# Patient Record
Sex: MALE | Race: WHITE | Employment: FULL TIME | URBAN - METROPOLITAN AREA
[De-identification: names, ages, dates, MRNs, and addresses within clinical notes are randomized per-mention and may not be internally consistent; named-entity substitution may affect disease eponyms.]

---

## 2017-01-19 ENCOUNTER — OFFICE VISIT (OUTPATIENT)
Dept: FAMILY MEDICINE CLINIC | Age: 52
End: 2017-01-19

## 2017-01-19 VITALS
RESPIRATION RATE: 18 BRPM | SYSTOLIC BLOOD PRESSURE: 136 MMHG | BODY MASS INDEX: 40.09 KG/M2 | HEIGHT: 70 IN | DIASTOLIC BLOOD PRESSURE: 78 MMHG | TEMPERATURE: 98 F | HEART RATE: 74 BPM | WEIGHT: 280 LBS

## 2017-01-19 DIAGNOSIS — J02.9 SORE THROAT: Primary | ICD-10-CM

## 2017-01-19 DIAGNOSIS — B34.9 VIRAL ILLNESS: ICD-10-CM

## 2017-01-19 LAB — S PYO AG THROAT QL: NORMAL

## 2017-01-19 PROCEDURE — G8427 DOCREV CUR MEDS BY ELIG CLIN: HCPCS | Performed by: NURSE PRACTITIONER

## 2017-01-19 PROCEDURE — 87880 STREP A ASSAY W/OPTIC: CPT | Performed by: NURSE PRACTITIONER

## 2017-01-19 PROCEDURE — G8419 CALC BMI OUT NRM PARAM NOF/U: HCPCS | Performed by: NURSE PRACTITIONER

## 2017-01-19 PROCEDURE — 1036F TOBACCO NON-USER: CPT | Performed by: NURSE PRACTITIONER

## 2017-01-19 PROCEDURE — G8483 FLU IMM NO ADMIN DOC REA: HCPCS | Performed by: NURSE PRACTITIONER

## 2017-01-19 PROCEDURE — 99213 OFFICE O/P EST LOW 20 MIN: CPT | Performed by: NURSE PRACTITIONER

## 2017-01-19 ASSESSMENT — ENCOUNTER SYMPTOMS
NAUSEA: 0
SINUS PRESSURE: 0
COUGH: 1
DIARRHEA: 0
ABDOMINAL PAIN: 0
VISUAL CHANGE: 0
RHINORRHEA: 0
VOMITING: 0
ALLERGIC/IMMUNOLOGIC NEGATIVE: 1
WHEEZING: 0
EYES NEGATIVE: 1
TROUBLE SWALLOWING: 0
GASTROINTESTINAL NEGATIVE: 1
SORE THROAT: 1
SHORTNESS OF BREATH: 0
SWOLLEN GLANDS: 0

## 2017-04-19 ENCOUNTER — OFFICE VISIT (OUTPATIENT)
Dept: FAMILY MEDICINE CLINIC | Age: 52
End: 2017-04-19

## 2017-04-19 VITALS
HEART RATE: 73 BPM | BODY MASS INDEX: 39.8 KG/M2 | HEIGHT: 70 IN | WEIGHT: 278 LBS | TEMPERATURE: 98.1 F | RESPIRATION RATE: 95 BRPM | DIASTOLIC BLOOD PRESSURE: 84 MMHG | SYSTOLIC BLOOD PRESSURE: 132 MMHG

## 2017-04-19 DIAGNOSIS — J02.9 SORE THROAT: ICD-10-CM

## 2017-04-19 DIAGNOSIS — J01.90 ACUTE RHINOSINUSITIS: Primary | ICD-10-CM

## 2017-04-19 LAB — S PYO AG THROAT QL: NORMAL

## 2017-04-19 PROCEDURE — G8419 CALC BMI OUT NRM PARAM NOF/U: HCPCS | Performed by: NURSE PRACTITIONER

## 2017-04-19 PROCEDURE — 1036F TOBACCO NON-USER: CPT | Performed by: NURSE PRACTITIONER

## 2017-04-19 PROCEDURE — 87880 STREP A ASSAY W/OPTIC: CPT | Performed by: NURSE PRACTITIONER

## 2017-04-19 PROCEDURE — 99212 OFFICE O/P EST SF 10 MIN: CPT | Performed by: NURSE PRACTITIONER

## 2017-04-19 PROCEDURE — G8427 DOCREV CUR MEDS BY ELIG CLIN: HCPCS | Performed by: NURSE PRACTITIONER

## 2017-04-19 RX ORDER — AZITHROMYCIN 250 MG/1
TABLET, FILM COATED ORAL
Qty: 6 TABLET | Refills: 0 | Status: SHIPPED | OUTPATIENT
Start: 2017-04-19 | End: 2017-07-05 | Stop reason: ALTCHOICE

## 2017-04-19 ASSESSMENT — ENCOUNTER SYMPTOMS
COUGH: 1
WHEEZING: 1
RHINORRHEA: 1
SORE THROAT: 1
NAUSEA: 0
SINUS PAIN: 1
VOMITING: 0
DIARRHEA: 0
SWOLLEN GLANDS: 1

## 2017-04-19 ASSESSMENT — PATIENT HEALTH QUESTIONNAIRE - PHQ9
SUM OF ALL RESPONSES TO PHQ9 QUESTIONS 1 & 2: 0
SUM OF ALL RESPONSES TO PHQ QUESTIONS 1-9: 0
2. FEELING DOWN, DEPRESSED OR HOPELESS: 0
1. LITTLE INTEREST OR PLEASURE IN DOING THINGS: 0

## 2017-07-05 ENCOUNTER — OFFICE VISIT (OUTPATIENT)
Dept: FAMILY MEDICINE CLINIC | Age: 52
End: 2017-07-05

## 2017-07-05 VITALS
HEART RATE: 82 BPM | DIASTOLIC BLOOD PRESSURE: 86 MMHG | BODY MASS INDEX: 40.06 KG/M2 | WEIGHT: 279.2 LBS | SYSTOLIC BLOOD PRESSURE: 122 MMHG | TEMPERATURE: 98.8 F | RESPIRATION RATE: 16 BRPM

## 2017-07-05 DIAGNOSIS — H65.02 ACUTE SEROUS OTITIS MEDIA OF LEFT EAR, RECURRENCE NOT SPECIFIED: Primary | ICD-10-CM

## 2017-07-05 PROCEDURE — G8427 DOCREV CUR MEDS BY ELIG CLIN: HCPCS | Performed by: NURSE PRACTITIONER

## 2017-07-05 PROCEDURE — 3017F COLORECTAL CA SCREEN DOC REV: CPT | Performed by: NURSE PRACTITIONER

## 2017-07-05 PROCEDURE — 1036F TOBACCO NON-USER: CPT | Performed by: NURSE PRACTITIONER

## 2017-07-05 PROCEDURE — G8417 CALC BMI ABV UP PARAM F/U: HCPCS | Performed by: NURSE PRACTITIONER

## 2017-07-05 PROCEDURE — 99213 OFFICE O/P EST LOW 20 MIN: CPT | Performed by: NURSE PRACTITIONER

## 2017-07-05 RX ORDER — AZITHROMYCIN 250 MG/1
TABLET, FILM COATED ORAL
Qty: 6 TABLET | Refills: 0 | Status: SHIPPED | OUTPATIENT
Start: 2017-07-05

## 2017-07-05 ASSESSMENT — ENCOUNTER SYMPTOMS
RHINORRHEA: 0
SINUS PRESSURE: 0
SORE THROAT: 0

## 2023-03-20 DIAGNOSIS — R00.2 HEART PALPITATIONS: Primary | ICD-10-CM

## 2023-03-20 PROBLEM — L72.9 SCALP CYST: Status: ACTIVE | Noted: 2023-03-20

## 2023-03-20 PROBLEM — L63.9 ALOPECIA AREATA: Status: ACTIVE | Noted: 2023-03-20

## 2023-03-20 PROBLEM — J00 NASOPHARYNGITIS: Status: ACTIVE | Noted: 2023-03-20

## 2023-03-20 PROBLEM — I10 BENIGN ESSENTIAL HYPERTENSION: Status: ACTIVE | Noted: 2023-03-20

## 2023-03-20 PROBLEM — J34.89 NASAL CONGESTION WITH RHINORRHEA: Status: ACTIVE | Noted: 2023-03-20

## 2023-03-20 PROBLEM — T75.3XXA SEA SICKNESS: Status: ACTIVE | Noted: 2023-03-20

## 2023-03-20 PROBLEM — K42.9 UMBILICAL HERNIA WITHOUT OBSTRUCTION AND WITHOUT GANGRENE: Status: ACTIVE | Noted: 2023-03-20

## 2023-03-20 PROBLEM — H66.91 ACUTE RIGHT OTITIS MEDIA: Status: ACTIVE | Noted: 2023-03-20

## 2023-03-20 PROBLEM — Z20.822 SUSPECTED COVID-19 VIRUS INFECTION: Status: ACTIVE | Noted: 2023-03-20

## 2023-03-20 PROBLEM — J01.40 ACUTE NON-RECURRENT PANSINUSITIS: Status: ACTIVE | Noted: 2023-03-20

## 2023-03-20 PROBLEM — E78.00 HYPERCHOLESTEROLEMIA: Status: ACTIVE | Noted: 2023-03-20

## 2023-03-20 PROBLEM — R03.0 PREHYPERTENSION: Status: ACTIVE | Noted: 2023-03-20

## 2023-03-20 PROBLEM — M54.16 LUMBAR RADICULOPATHY, RIGHT: Status: ACTIVE | Noted: 2023-03-20

## 2023-03-20 PROBLEM — R05.9 COUGH: Status: ACTIVE | Noted: 2023-03-20

## 2023-03-20 PROBLEM — M47.26 OSTEOARTHRITIS OF SPINE WITH RADICULOPATHY, LUMBAR REGION: Status: ACTIVE | Noted: 2023-03-20

## 2023-03-20 PROBLEM — R09.81 NASAL CONGESTION WITH RHINORRHEA: Status: ACTIVE | Noted: 2023-03-20

## 2023-03-20 RX ORDER — ROSUVASTATIN CALCIUM 10 MG/1
TABLET, COATED ORAL
COMMUNITY
Start: 2020-12-13 | End: 2023-07-31

## 2023-03-20 RX ORDER — LISINOPRIL 5 MG/1
TABLET ORAL
COMMUNITY
Start: 2020-01-22 | End: 2023-08-18

## 2023-03-20 RX ORDER — VIT C/E/ZN/COPPR/LUTEIN/ZEAXAN 250MG-90MG
CAPSULE ORAL
COMMUNITY
Start: 2022-01-11

## 2023-03-20 RX ORDER — SCOLOPAMINE TRANSDERMAL SYSTEM 1 MG/1
PATCH, EXTENDED RELEASE TRANSDERMAL
COMMUNITY
Start: 2023-01-17

## 2023-03-20 RX ORDER — METOPROLOL SUCCINATE 25 MG/1
1 TABLET, EXTENDED RELEASE ORAL DAILY
COMMUNITY
Start: 2022-02-16 | End: 2023-03-20 | Stop reason: SDUPTHER

## 2023-03-20 RX ORDER — METOPROLOL SUCCINATE 25 MG/1
TABLET, EXTENDED RELEASE ORAL
Qty: 90 TABLET | Refills: 3 | Status: SHIPPED | OUTPATIENT
Start: 2023-03-20 | End: 2024-02-21

## 2023-07-31 DIAGNOSIS — E78.00 HYPERCHOLESTEROLEMIA: ICD-10-CM

## 2023-07-31 RX ORDER — ROSUVASTATIN CALCIUM 10 MG/1
TABLET, COATED ORAL
Qty: 90 TABLET | Refills: 0 | Status: SHIPPED | OUTPATIENT
Start: 2023-07-31 | End: 2023-10-29

## 2023-07-31 NOTE — TELEPHONE ENCOUNTER
Please advise Dr. Nguyen patient.       Medication has been pended to provider for approval.     LV: 1/10/23  NV:  none

## 2023-08-17 DIAGNOSIS — I10 BENIGN ESSENTIAL HYPERTENSION: ICD-10-CM

## 2023-08-18 RX ORDER — LISINOPRIL 5 MG/1
TABLET ORAL DAILY
Qty: 90 TABLET | Refills: 3 | Status: SHIPPED | OUTPATIENT
Start: 2023-08-18

## 2023-10-02 ENCOUNTER — OFFICE VISIT (OUTPATIENT)
Dept: PRIMARY CARE | Facility: CLINIC | Age: 58
End: 2023-10-02
Payer: COMMERCIAL

## 2023-10-02 VITALS
OXYGEN SATURATION: 95 % | DIASTOLIC BLOOD PRESSURE: 74 MMHG | TEMPERATURE: 97.6 F | SYSTOLIC BLOOD PRESSURE: 122 MMHG | HEART RATE: 85 BPM | BODY MASS INDEX: 40.46 KG/M2 | WEIGHT: 282.6 LBS | RESPIRATION RATE: 16 BRPM | HEIGHT: 70 IN

## 2023-10-02 DIAGNOSIS — L03.115 CELLULITIS OF RIGHT LOWER EXTREMITY: Primary | ICD-10-CM

## 2023-10-02 PROCEDURE — 99213 OFFICE O/P EST LOW 20 MIN: CPT | Performed by: NURSE PRACTITIONER

## 2023-10-02 RX ORDER — DOXYCYCLINE 100 MG/1
100 CAPSULE ORAL 2 TIMES DAILY
Qty: 20 CAPSULE | Refills: 0 | Status: SHIPPED | OUTPATIENT
Start: 2023-10-02 | End: 2023-10-10 | Stop reason: SDUPTHER

## 2023-10-02 ASSESSMENT — ENCOUNTER SYMPTOMS
FEVER: 1
COUGH: 1
DIZZINESS: 0
SORE THROAT: 0
WEAKNESS: 0
SINUS PRESSURE: 0
PALPITATIONS: 0
COLOR CHANGE: 1
FATIGUE: 1
CHILLS: 1
SINUS PAIN: 0
HEADACHES: 1
SHORTNESS OF BREATH: 0

## 2023-10-02 ASSESSMENT — PATIENT HEALTH QUESTIONNAIRE - PHQ9
2. FEELING DOWN, DEPRESSED OR HOPELESS: NOT AT ALL
SUM OF ALL RESPONSES TO PHQ9 QUESTIONS 1 AND 2: 0
1. LITTLE INTEREST OR PLEASURE IN DOING THINGS: NOT AT ALL

## 2023-10-02 NOTE — PROGRESS NOTES
"Subjective   Patient ID: Hay Miller is a 58 y.o. male who presents for URI and Cellulitis.    HPI pt in office with an URI symptoms of fever and chills, and cough. Pt took otc advil with no help.    Pt in office with right leg cellulitis has redness , warm to touch started in groin area and is sore all the way down to leg. On right side. Pt did not take any otc meds for this symptom.      68-year-old male presents today with possible cellulitis on his right leg.  He states that his symptoms started a few days prior. Yesterday he noticed that his right groin was tender.  Today, he noticed redness and warmth on his right shin.  Patient does report a history of cellulitis, and states that this feels very similar to when he last was treated.  He denies any history of MRSA.  No history of DVT, He did take ibuprofen earlier today, and states that he was feeling chilled yesterday.  He is also experiencing a slight cough.    Review of Systems   Constitutional:  Positive for chills, fatigue and fever.   HENT:  Negative for congestion, sinus pressure, sinus pain and sore throat.    Respiratory:  Positive for cough. Negative for shortness of breath.    Cardiovascular:  Negative for chest pain and palpitations.   Skin:  Positive for color change.   Neurological:  Positive for headaches. Negative for dizziness and weakness.       Objective   /74   Pulse 85   Temp 36.4 °C (97.6 °F) (Temporal)   Resp 16   Ht 1.778 m (5' 10\")   Wt 128 kg (282 lb 9.6 oz)   SpO2 95%   BMI 40.55 kg/m²     Physical Exam  Vitals and nursing note reviewed.   Constitutional:       Appearance: Normal appearance. He is obese.   Cardiovascular:      Rate and Rhythm: Normal rate and regular rhythm.   Pulmonary:      Effort: Pulmonary effort is normal. No respiratory distress.      Breath sounds: Normal breath sounds.   Lymphadenopathy:      Lower Body: Right inguinal adenopathy present.   Skin:     Comments: RIGHT LOWER EXTREMITY: Area " of redness, warmth and tenderness noted over anterior shin approximately 4 in in length. No calf tenderness or pain. Negative Twan's sign.   Neurological:      Mental Status: He is alert.         Assessment/Plan   Problem List Items Addressed This Visit    None  Visit Diagnoses         Codes    Cellulitis of right lower extremity    -  Primary L03.115    Relevant Medications    doxycycline (Vibramycin) 100 mg capsule        Cellulitis: Will treat with doxycycline.  Follow-up with PCP in 2 to 3 days for recheck, or sooner with any additional concerns.  If any worsening symptoms, increasing redness, pain, red streaking coming from the area, developing fever/chills, proceed to emergency department.

## 2023-10-02 NOTE — PATIENT INSTRUCTIONS
Today you were seen for a skin infection/cellulitis.  Keep area clean and dry.  Keep area clean with regular soap and water.  Do not soak the area, (no baths or swimming, but may take showers).  Start antibiotic as directed.  Please follow-up with your primary care provider in 2-3 days for recheck with any persisting symptoms, or sooner with any additional concerns.  If developing any worsening pain, redness, swelling, red streaking coming from the area or fever/chills, go to the emergency department for further evaluation.

## 2023-10-04 ENCOUNTER — HOSPITAL ENCOUNTER (EMERGENCY)
Facility: HOSPITAL | Age: 58
Discharge: HOME | End: 2023-10-04
Attending: EMERGENCY MEDICINE
Payer: COMMERCIAL

## 2023-10-04 ENCOUNTER — APPOINTMENT (OUTPATIENT)
Dept: CARDIOLOGY | Facility: HOSPITAL | Age: 58
End: 2023-10-04
Payer: COMMERCIAL

## 2023-10-04 VITALS
HEIGHT: 70 IN | DIASTOLIC BLOOD PRESSURE: 92 MMHG | OXYGEN SATURATION: 94 % | BODY MASS INDEX: 40.23 KG/M2 | WEIGHT: 281 LBS | HEART RATE: 78 BPM | RESPIRATION RATE: 18 BRPM | SYSTOLIC BLOOD PRESSURE: 130 MMHG | TEMPERATURE: 98.1 F

## 2023-10-04 DIAGNOSIS — L03.115 CELLULITIS OF RIGHT LOWER EXTREMITY: Primary | ICD-10-CM

## 2023-10-04 DIAGNOSIS — M79.89 OTHER SPECIFIED SOFT TISSUE DISORDERS: ICD-10-CM

## 2023-10-04 LAB
ALBUMIN SERPL BCP-MCNC: 3.9 G/DL (ref 3.4–5)
ALP SERPL-CCNC: 60 U/L (ref 33–120)
ALT SERPL W P-5'-P-CCNC: 29 U/L (ref 10–52)
ANION GAP SERPL CALC-SCNC: 11 MMOL/L (ref 10–20)
AST SERPL W P-5'-P-CCNC: 22 U/L (ref 9–39)
BASOPHILS # BLD AUTO: 0.05 X10*3/UL (ref 0–0.1)
BASOPHILS NFR BLD AUTO: 0.6 %
BILIRUB SERPL-MCNC: 0.7 MG/DL (ref 0–1.2)
BUN SERPL-MCNC: 15 MG/DL (ref 6–23)
CALCIUM SERPL-MCNC: 9.3 MG/DL (ref 8.6–10.3)
CHLORIDE SERPL-SCNC: 102 MMOL/L (ref 98–107)
CO2 SERPL-SCNC: 27 MMOL/L (ref 21–32)
CREAT SERPL-MCNC: 1.15 MG/DL (ref 0.5–1.3)
EOSINOPHIL # BLD AUTO: 0.04 X10*3/UL (ref 0–0.7)
EOSINOPHIL NFR BLD AUTO: 0.5 %
ERYTHROCYTE [DISTWIDTH] IN BLOOD BY AUTOMATED COUNT: 12.6 % (ref 11.5–14.5)
GFR SERPL CREATININE-BSD FRML MDRD: 75 ML/MIN/1.73M*2
GLUCOSE SERPL-MCNC: 112 MG/DL (ref 74–99)
HCT VFR BLD AUTO: 47.3 % (ref 41–52)
HGB BLD-MCNC: 16.2 G/DL (ref 13.5–17.5)
IMM GRANULOCYTES # BLD AUTO: 0.04 X10*3/UL (ref 0–0.7)
IMM GRANULOCYTES NFR BLD AUTO: 0.5 % (ref 0–0.9)
LYMPHOCYTES # BLD AUTO: 2.09 X10*3/UL (ref 1.2–4.8)
LYMPHOCYTES NFR BLD AUTO: 25.8 %
MCH RBC QN AUTO: 29.8 PG (ref 26–34)
MCHC RBC AUTO-ENTMCNC: 34.2 G/DL (ref 32–36)
MCV RBC AUTO: 87 FL (ref 80–100)
MONOCYTES # BLD AUTO: 1.03 X10*3/UL (ref 0.1–1)
MONOCYTES NFR BLD AUTO: 12.7 %
NEUTROPHILS # BLD AUTO: 4.84 X10*3/UL (ref 1.2–7.7)
NEUTROPHILS NFR BLD AUTO: 59.9 %
NRBC BLD-RTO: 0 /100 WBCS (ref 0–0)
PLATELET # BLD AUTO: 232 X10*3/UL (ref 150–450)
PMV BLD AUTO: 9.9 FL (ref 7.5–11.5)
POTASSIUM SERPL-SCNC: 4.5 MMOL/L (ref 3.5–5.3)
PROT SERPL-MCNC: 7.2 G/DL (ref 6.4–8.2)
RBC # BLD AUTO: 5.43 X10*6/UL (ref 4.5–5.9)
SODIUM SERPL-SCNC: 135 MMOL/L (ref 136–145)
WBC # BLD AUTO: 8.1 X10*3/UL (ref 4.4–11.3)

## 2023-10-04 PROCEDURE — 93971 EXTREMITY STUDY: CPT

## 2023-10-04 PROCEDURE — 99284 EMERGENCY DEPT VISIT MOD MDM: CPT | Mod: 25 | Performed by: EMERGENCY MEDICINE

## 2023-10-04 PROCEDURE — 85025 COMPLETE CBC W/AUTO DIFF WBC: CPT | Performed by: EMERGENCY MEDICINE

## 2023-10-04 PROCEDURE — 99284 EMERGENCY DEPT VISIT MOD MDM: CPT | Performed by: EMERGENCY MEDICINE

## 2023-10-04 PROCEDURE — 96365 THER/PROPH/DIAG IV INF INIT: CPT

## 2023-10-04 PROCEDURE — 96375 TX/PRO/DX INJ NEW DRUG ADDON: CPT

## 2023-10-04 PROCEDURE — 2500000004 HC RX 250 GENERAL PHARMACY W/ HCPCS (ALT 636 FOR OP/ED)

## 2023-10-04 PROCEDURE — 2500000004 HC RX 250 GENERAL PHARMACY W/ HCPCS (ALT 636 FOR OP/ED): Performed by: EMERGENCY MEDICINE

## 2023-10-04 PROCEDURE — 36415 COLL VENOUS BLD VENIPUNCTURE: CPT | Performed by: EMERGENCY MEDICINE

## 2023-10-04 PROCEDURE — 80053 COMPREHEN METABOLIC PANEL: CPT | Performed by: EMERGENCY MEDICINE

## 2023-10-04 PROCEDURE — 93971 EXTREMITY STUDY: CPT | Performed by: INTERNAL MEDICINE

## 2023-10-04 RX ORDER — KETOROLAC TROMETHAMINE 15 MG/ML
15 INJECTION, SOLUTION INTRAMUSCULAR; INTRAVENOUS ONCE
Status: COMPLETED | OUTPATIENT
Start: 2023-10-04 | End: 2023-10-04

## 2023-10-04 RX ORDER — CEFUROXIME AXETIL 500 MG/1
500 TABLET ORAL 2 TIMES DAILY
Qty: 14 TABLET | Refills: 0 | Status: SHIPPED | OUTPATIENT
Start: 2023-10-04 | End: 2023-10-04 | Stop reason: SDUPTHER

## 2023-10-04 RX ORDER — CEFUROXIME AXETIL 500 MG/1
500 TABLET ORAL 2 TIMES DAILY
Qty: 14 TABLET | Refills: 0 | Status: SHIPPED | OUTPATIENT
Start: 2023-10-04 | End: 2023-10-10 | Stop reason: SDUPTHER

## 2023-10-04 RX ORDER — KETOROLAC TROMETHAMINE 30 MG/ML
15 INJECTION, SOLUTION INTRAMUSCULAR; INTRAVENOUS ONCE
Status: DISCONTINUED | OUTPATIENT
Start: 2023-10-04 | End: 2023-10-04

## 2023-10-04 RX ORDER — CEFTRIAXONE 1 G/50ML
1 INJECTION, SOLUTION INTRAVENOUS ONCE
Status: COMPLETED | OUTPATIENT
Start: 2023-10-04 | End: 2023-10-04

## 2023-10-04 RX ADMIN — KETOROLAC TROMETHAMINE 15 MG: 15 INJECTION, SOLUTION INTRAMUSCULAR; INTRAVENOUS at 11:57

## 2023-10-04 RX ADMIN — CEFTRIAXONE SODIUM 1 G: 1 INJECTION, SOLUTION INTRAVENOUS at 11:18

## 2023-10-04 ASSESSMENT — LIFESTYLE VARIABLES
EVER FELT BAD OR GUILTY ABOUT YOUR DRINKING: NO
HAVE PEOPLE ANNOYED YOU BY CRITICIZING YOUR DRINKING: NO
EVER HAD A DRINK FIRST THING IN THE MORNING TO STEADY YOUR NERVES TO GET RID OF A HANGOVER: NO
HAVE YOU EVER FELT YOU SHOULD CUT DOWN ON YOUR DRINKING: NO

## 2023-10-04 ASSESSMENT — COLUMBIA-SUICIDE SEVERITY RATING SCALE - C-SSRS
6. HAVE YOU EVER DONE ANYTHING, STARTED TO DO ANYTHING, OR PREPARED TO DO ANYTHING TO END YOUR LIFE?: NO
1. IN THE PAST MONTH, HAVE YOU WISHED YOU WERE DEAD OR WISHED YOU COULD GO TO SLEEP AND NOT WAKE UP?: NO
2. HAVE YOU ACTUALLY HAD ANY THOUGHTS OF KILLING YOURSELF?: NO

## 2023-10-04 ASSESSMENT — PAIN SCALES - GENERAL
PAINLEVEL_OUTOF10: 5 - MODERATE PAIN
PAINLEVEL_OUTOF10: 5 - MODERATE PAIN

## 2023-10-04 ASSESSMENT — PAIN - FUNCTIONAL ASSESSMENT: PAIN_FUNCTIONAL_ASSESSMENT: 0-10

## 2023-10-04 ASSESSMENT — PAIN DESCRIPTION - ORIENTATION: ORIENTATION: RIGHT

## 2023-10-04 ASSESSMENT — PAIN DESCRIPTION - DESCRIPTORS: DESCRIPTORS: ACHING

## 2023-10-04 ASSESSMENT — PAIN DESCRIPTION - LOCATION: LOCATION: LEG

## 2023-10-04 NOTE — ED PROVIDER NOTES
HPI   Chief Complaint   Patient presents with    Leg Swelling     Right leg swelling since Saturday pt was dx with cellulitis and given oral ATB, pt states not improving       Patient is a 58-year-old male presenting with complaint of right lower extremity cellulitis.  He was diagnosed with this past Saturday and was started on doxycycline as outpatient.  Has not had any significant improvement in his symptoms.  The leg is still swollen red and painful.  He is also had fevers at home intermittently.  Denies any nausea vomiting chest pain shortness of breath lightheadedness dizziness.  Has not had issues with cellulitis in this leg before, states he has had in the left lower extremity performed.  History is otherwise notable for hypertension, hyperlipidemia.  Denies tobacco use, endorses occasional alcohol use, denies any other drug use.                              No data recorded                Patient History   No past medical history on file.  Past Surgical History:   Procedure Laterality Date    OTHER SURGICAL HISTORY  07/09/2019    Cyst excision    OTHER SURGICAL HISTORY  07/09/2019    Carpal tunnel surgery    OTHER SURGICAL HISTORY  07/19/2019    Tonsillectomy with adenoidectomy    OTHER SURGICAL HISTORY  02/13/2020    Hernia repair     No family history on file.  Social History     Tobacco Use    Smoking status: Never    Smokeless tobacco: Never   Substance Use Topics    Alcohol use: Not on file    Drug use: Never       Physical Exam   ED Triage Vitals [10/04/23 1018]   Temp Heart Rate Resp BP   36.7 °C (98.1 °F) 81 -- 128/83      SpO2 Temp Source Heart Rate Source Patient Position   97 % Temporal Monitor Sitting      BP Location FiO2 (%)     Right arm --       Physical Exam  Vitals and nursing note reviewed.   Constitutional:       General: He is not in acute distress.     Appearance: Normal appearance. He is not ill-appearing or toxic-appearing.   HENT:      Head: Normocephalic and atraumatic.      Right  Ear: External ear normal.      Left Ear: External ear normal.      Nose: Nose normal.   Eyes:      General:         Right eye: No discharge.         Left eye: No discharge.      Extraocular Movements: Extraocular movements intact.      Conjunctiva/sclera: Conjunctivae normal.      Pupils: Pupils are equal, round, and reactive to light.   Cardiovascular:      Rate and Rhythm: Normal rate and regular rhythm.      Pulses: Normal pulses.      Heart sounds: Normal heart sounds. No murmur heard.  Pulmonary:      Effort: Pulmonary effort is normal.      Breath sounds: Normal breath sounds.   Abdominal:      General: There is no distension.      Palpations: Abdomen is soft. There is no mass.      Tenderness: There is no abdominal tenderness. There is no guarding.   Musculoskeletal:         General: Swelling and tenderness present. No deformity or signs of injury. Normal range of motion.   Skin:     General: Skin is warm.      Findings: Erythema present.   Neurological:      General: No focal deficit present.      Mental Status: He is alert and oriented to person, place, and time. Mental status is at baseline.   Psychiatric:         Mood and Affect: Mood normal.         Behavior: Behavior normal.         ED Course & MDM   Diagnoses as of 10/04/23 1157   Cellulitis of right lower extremity       Medical Decision Making  58-year-old male present with chief complaint of right lower extremity cellulitis.  Has been on doxycycline as outpatient and has not seen any significant improvement.  Was diagnosed 5 days ago.  Overall is well-appearing.  Normal vital signs.  There is evidence of cellulitis in the right lower extremity with warmth, erythema and tenderness palpation.  Will order DVT ultrasound to rule out DVT.  We will also obtain basic labs to evaluate for signs of worsening infection.    Overall lab work is unremarkable.  Normal white count.  Chemistry unremarkable.  Given that patient was likely undertreated for his  cellulitis as outpatient, he will be discharged with a prescription for Ceftin 500 mg twice a day for the next 7 days to treat his lower extremity cellulitis.  Patient advised to continue taking his doxycycline until he completes this.  Otherwise advised to follow-up with his primary care provider for ongoing symptoms.  Return precautions were discussed and symptoms develop for that would warrant return to the ER for evaluation were discussed as well.  Patient is in agreement with this plan.  He was discharged home stable condition.    Kaiden Rosario DO, PGY-2  Emergency Medicine    Plan of care discussed with the attending physician.        Amount and/or Complexity of Data Reviewed  Labs: ordered.  Radiology: ordered.    Risk  Prescription drug management.  Decision regarding hospitalization.        Procedure  Procedures     Kaiden Rosario DO  Resident  10/04/23 5034

## 2023-10-04 NOTE — Clinical Note
Hay Angela was seen and treated in our emergency department on 10/4/2023.  He may return to work on 10/09/2023.       If you have any questions or concerns, please don't hesitate to call.      Kaiden Rosario, DO

## 2023-10-05 RX ORDER — GUAIFENESIN 600 MG/1
TABLET, EXTENDED RELEASE ORAL EVERY 12 HOURS PRN
COMMUNITY

## 2023-10-05 RX ORDER — AZITHROMYCIN 250 MG/1
TABLET, FILM COATED ORAL
COMMUNITY
Start: 2022-04-16

## 2023-10-05 RX ORDER — UBIDECARENONE 75 MG
CAPSULE ORAL
COMMUNITY

## 2023-10-05 RX ORDER — LORATADINE 10 MG/1
1 TABLET ORAL DAILY
COMMUNITY
End: 2023-10-10 | Stop reason: SDUPTHER

## 2023-10-05 RX ORDER — ZINC GLUCONATE 100 MG
1 TABLET ORAL DAILY
COMMUNITY
Start: 2022-01-11

## 2023-10-06 ENCOUNTER — OFFICE VISIT (OUTPATIENT)
Dept: PRIMARY CARE | Facility: CLINIC | Age: 58
End: 2023-10-06
Payer: COMMERCIAL

## 2023-10-06 VITALS
OXYGEN SATURATION: 98 % | WEIGHT: 286 LBS | HEIGHT: 70 IN | TEMPERATURE: 97.5 F | DIASTOLIC BLOOD PRESSURE: 70 MMHG | BODY MASS INDEX: 40.94 KG/M2 | HEART RATE: 76 BPM | SYSTOLIC BLOOD PRESSURE: 124 MMHG

## 2023-10-06 DIAGNOSIS — J30.1 SEASONAL ALLERGIC RHINITIS DUE TO POLLEN: ICD-10-CM

## 2023-10-06 DIAGNOSIS — E78.00 HYPERCHOLESTEROLEMIA: ICD-10-CM

## 2023-10-06 DIAGNOSIS — E66.01 CLASS 3 SEVERE OBESITY DUE TO EXCESS CALORIES WITH SERIOUS COMORBIDITY AND BODY MASS INDEX (BMI) OF 40.0 TO 44.9 IN ADULT (MULTI): ICD-10-CM

## 2023-10-06 DIAGNOSIS — L03.115 CELLULITIS OF RIGHT LOWER EXTREMITY: Primary | ICD-10-CM

## 2023-10-06 PROCEDURE — 99214 OFFICE O/P EST MOD 30 MIN: CPT | Performed by: FAMILY MEDICINE

## 2023-10-06 PROCEDURE — 1036F TOBACCO NON-USER: CPT | Performed by: FAMILY MEDICINE

## 2023-10-06 PROCEDURE — 3074F SYST BP LT 130 MM HG: CPT | Performed by: FAMILY MEDICINE

## 2023-10-06 PROCEDURE — 3078F DIAST BP <80 MM HG: CPT | Performed by: FAMILY MEDICINE

## 2023-10-06 PROCEDURE — 3008F BODY MASS INDEX DOCD: CPT | Performed by: FAMILY MEDICINE

## 2023-10-06 RX ORDER — LORATADINE PSEUDOEPHEDRINE SULFATE 10; 240 MG/1; MG/1
1 TABLET, EXTENDED RELEASE ORAL DAILY
Qty: 90 TABLET | Refills: 1 | Status: SHIPPED | OUTPATIENT
Start: 2023-10-06 | End: 2023-10-10 | Stop reason: SDUPTHER

## 2023-10-06 RX ORDER — LORATADINE 10 MG/1
10 TABLET ORAL DAILY
Qty: 90 TABLET | Refills: 3 | Status: CANCELLED | OUTPATIENT
Start: 2023-10-06

## 2023-10-06 ASSESSMENT — ENCOUNTER SYMPTOMS
CHEST TIGHTNESS: 0
EYE DISCHARGE: 0
NECK PAIN: 0
ADENOPATHY: 0
ARTHRALGIAS: 0
DIARRHEA: 0
BLOOD IN STOOL: 0
MYALGIAS: 0
DIFFICULTY URINATING: 0
HEADACHES: 0
WEAKNESS: 0
SORE THROAT: 0
FATIGUE: 0
HEMATURIA: 0
CONSTIPATION: 0
NAUSEA: 0
NERVOUS/ANXIOUS: 0
SHORTNESS OF BREATH: 0
NUMBNESS: 0
COUGH: 0
DYSPHORIC MOOD: 0
VOMITING: 0
BRUISES/BLEEDS EASILY: 0
DIZZINESS: 0
ACTIVITY CHANGE: 0
BACK PAIN: 0
ABDOMINAL PAIN: 0

## 2023-10-06 NOTE — PROGRESS NOTES
"Subjective   Patient ID: Hay Miller is a 58 y.o. male who presents for Hospital Follow-up and Cellulitis.  HPI    Follow up in hospital for Cellulitis   Patient of Dr. Nguyen  Was seen in the emergency department and in the outpatient clinic  Taking 2 antibiotics  Still has about a weeks worth of medicine in the area is improving but not completely resolved    Obese  Recommend weight loss    Seasonal allergies stable  Would like refill of Claritin-D which helps his symptoms    High cholesterol stable tolerates Crestor    Review of Systems   Constitutional:  Negative for activity change and fatigue.   HENT:  Negative for congestion and sore throat.    Eyes:  Negative for discharge.   Respiratory:  Negative for cough, chest tightness and shortness of breath.    Cardiovascular:  Negative for chest pain and leg swelling.   Gastrointestinal:  Negative for abdominal pain, blood in stool, constipation, diarrhea, nausea and vomiting.   Endocrine: Negative for cold intolerance and heat intolerance.   Genitourinary:  Negative for difficulty urinating and hematuria.   Musculoskeletal:  Negative for arthralgias, back pain, gait problem, myalgias and neck pain.   Skin:  Positive for rash.   Allergic/Immunologic: Negative for environmental allergies.   Neurological:  Negative for dizziness, syncope, weakness, numbness and headaches.   Hematological:  Negative for adenopathy. Does not bruise/bleed easily.   Psychiatric/Behavioral:  Negative for dysphoric mood. The patient is not nervous/anxious.    All other systems reviewed and are negative.      Objective   /70 (BP Location: Right arm)   Pulse 76   Temp 36.4 °C (97.5 °F) (Temporal)   Ht 1.778 m (5' 10\")   Wt 130 kg (286 lb)   SpO2 98%   BMI 41.04 kg/m²    Physical Exam  Vitals and nursing note reviewed.   Constitutional:       General: He is not in acute distress.     Appearance: Normal appearance.   HENT:      Head: Normocephalic and atraumatic.      Right " Ear: Tympanic membrane, ear canal and external ear normal.      Left Ear: Tympanic membrane, ear canal and external ear normal.      Nose: Nose normal.      Mouth/Throat:      Mouth: Mucous membranes are moist.      Pharynx: Oropharynx is clear. No oropharyngeal exudate or posterior oropharyngeal erythema.   Eyes:      Extraocular Movements: Extraocular movements intact.      Conjunctiva/sclera: Conjunctivae normal.      Pupils: Pupils are equal, round, and reactive to light.   Cardiovascular:      Rate and Rhythm: Normal rate and regular rhythm.      Pulses: Normal pulses.      Heart sounds: Normal heart sounds. No murmur heard.  Pulmonary:      Effort: Pulmonary effort is normal. No respiratory distress.      Breath sounds: Normal breath sounds. No wheezing or rales.   Abdominal:      General: Abdomen is flat. Bowel sounds are normal. There is no distension.      Palpations: Abdomen is soft. There is no mass.      Tenderness: There is no abdominal tenderness.   Musculoskeletal:         General: No swelling or deformity. Normal range of motion.      Cervical back: Normal range of motion and neck supple.      Right lower leg: No edema.      Left lower leg: No edema.   Lymphadenopathy:      Cervical: No cervical adenopathy.   Skin:     General: Skin is warm and dry.      Capillary Refill: Capillary refill takes less than 2 seconds.      Findings: No lesion or rash.      Comments: Resolving right lower extremity cellulitis at the shin   Neurological:      General: No focal deficit present.      Mental Status: He is alert and oriented to person, place, and time.      Cranial Nerves: No cranial nerve deficit.      Motor: No weakness.   Psychiatric:         Mood and Affect: Mood normal.         Behavior: Behavior normal.         Thought Content: Thought content normal.         Judgment: Judgment normal.         Assessment/Plan   Problem List Items Addressed This Visit       Hypercholesterolemia     Other Visit Diagnoses        Cellulitis of right lower extremity    -  Primary    Seasonal allergic rhinitis due to pollen        Relevant Medications    loratadine-pseudoephedrine (Claritin-D 24 Hour)  mg 24 hr tablet    Class 3 severe obesity due to excess calories with serious comorbidity and body mass index (BMI) of 40.0 to 44.9 in adult (CMS/[facility])                Patient education provided.  Stay current with age appropriate health maintenance as instructed.  Appointment here or ER with new or worsening symptoms'  Keep appropriate follow-up visit.  Stay current with proper immunizations   Refills as above  Continue antibiotics  Wound care  Keep follow-up with Wendy

## 2023-10-09 ENCOUNTER — PATIENT MESSAGE (OUTPATIENT)
Dept: PRIMARY CARE | Facility: CLINIC | Age: 58
End: 2023-10-09
Payer: COMMERCIAL

## 2023-10-09 DIAGNOSIS — J30.1 SEASONAL ALLERGIC RHINITIS DUE TO POLLEN: ICD-10-CM

## 2023-10-10 ENCOUNTER — OFFICE VISIT (OUTPATIENT)
Dept: PRIMARY CARE | Facility: CLINIC | Age: 58
End: 2023-10-10
Payer: COMMERCIAL

## 2023-10-10 VITALS
OXYGEN SATURATION: 98 % | DIASTOLIC BLOOD PRESSURE: 70 MMHG | WEIGHT: 284.8 LBS | BODY MASS INDEX: 40.77 KG/M2 | SYSTOLIC BLOOD PRESSURE: 124 MMHG | HEIGHT: 70 IN | TEMPERATURE: 97.7 F | HEART RATE: 83 BPM

## 2023-10-10 DIAGNOSIS — L03.115 CELLULITIS OF RIGHT LOWER EXTREMITY: ICD-10-CM

## 2023-10-10 PROCEDURE — 99213 OFFICE O/P EST LOW 20 MIN: CPT | Performed by: FAMILY MEDICINE

## 2023-10-10 PROCEDURE — 3008F BODY MASS INDEX DOCD: CPT | Performed by: FAMILY MEDICINE

## 2023-10-10 PROCEDURE — 3078F DIAST BP <80 MM HG: CPT | Performed by: FAMILY MEDICINE

## 2023-10-10 PROCEDURE — 1036F TOBACCO NON-USER: CPT | Performed by: FAMILY MEDICINE

## 2023-10-10 PROCEDURE — 3074F SYST BP LT 130 MM HG: CPT | Performed by: FAMILY MEDICINE

## 2023-10-10 RX ORDER — LORATADINE PSEUDOEPHEDRINE SULFATE 10; 240 MG/1; MG/1
1 TABLET, EXTENDED RELEASE ORAL DAILY
Qty: 90 TABLET | Refills: 1 | Status: SHIPPED | OUTPATIENT
Start: 2023-10-10 | End: 2024-10-09

## 2023-10-10 RX ORDER — CEFUROXIME AXETIL 500 MG/1
500 TABLET ORAL 2 TIMES DAILY
Qty: 14 TABLET | Refills: 0 | Status: SHIPPED | OUTPATIENT
Start: 2023-10-10 | End: 2023-10-17

## 2023-10-10 RX ORDER — DOXYCYCLINE 100 MG/1
100 CAPSULE ORAL 2 TIMES DAILY
Qty: 14 CAPSULE | Refills: 0 | Status: SHIPPED | OUTPATIENT
Start: 2023-10-10 | End: 2023-10-20

## 2023-10-10 RX ORDER — LORATADINE 10 MG/1
10 TABLET ORAL DAILY
Qty: 90 TABLET | Refills: 3 | Status: SHIPPED | OUTPATIENT
Start: 2023-10-10

## 2023-10-10 ASSESSMENT — ENCOUNTER SYMPTOMS
BACK PAIN: 0
CONSTIPATION: 0
ACTIVITY CHANGE: 0
ARTHRALGIAS: 0
DIFFICULTY URINATING: 0
ADENOPATHY: 0
SORE THROAT: 0
HEADACHES: 0
BLOOD IN STOOL: 0
WEAKNESS: 0
EYE DISCHARGE: 0
NERVOUS/ANXIOUS: 0
FATIGUE: 0
DIARRHEA: 0
SHORTNESS OF BREATH: 0
MYALGIAS: 0
NUMBNESS: 0
BRUISES/BLEEDS EASILY: 0
COUGH: 0
DYSPHORIC MOOD: 0
CHEST TIGHTNESS: 0
ABDOMINAL PAIN: 0
HEMATURIA: 0
NAUSEA: 0
VOMITING: 0
NECK PAIN: 0
DIZZINESS: 0

## 2023-10-10 NOTE — PROGRESS NOTES
"Subjective   Patient ID: Hay Miller is a 58 y.o. male who presents for Cellulitis.  HPI    Wanting to find out if he needs to continue the antibiotic   Still some pain and swelling mostly with touch   Leg cellulitis improved not resolved  He would benefit from some additional antibiotics and then he can and should be done    Review of Systems   Constitutional:  Negative for activity change and fatigue.   HENT:  Negative for congestion and sore throat.    Eyes:  Negative for discharge.   Respiratory:  Negative for cough, chest tightness and shortness of breath.    Cardiovascular:  Negative for chest pain and leg swelling.   Gastrointestinal:  Negative for abdominal pain, blood in stool, constipation, diarrhea, nausea and vomiting.   Endocrine: Negative for cold intolerance and heat intolerance.   Genitourinary:  Negative for difficulty urinating and hematuria.   Musculoskeletal:  Negative for arthralgias, back pain, gait problem, myalgias and neck pain.   Skin:  Positive for rash.   Allergic/Immunologic: Negative for environmental allergies.   Neurological:  Negative for dizziness, syncope, weakness, numbness and headaches.   Hematological:  Negative for adenopathy. Does not bruise/bleed easily.   Psychiatric/Behavioral:  Negative for dysphoric mood. The patient is not nervous/anxious.    All other systems reviewed and are negative.      Objective   /70 (BP Location: Right arm)   Pulse 83   Temp 36.5 °C (97.7 °F) (Temporal)   Ht 1.778 m (5' 10\")   Wt 129 kg (284 lb 12.8 oz)   SpO2 98%   BMI 40.86 kg/m²    Physical Exam  Vitals and nursing note reviewed.   Constitutional:       General: He is not in acute distress.     Appearance: Normal appearance.   HENT:      Head: Normocephalic and atraumatic.      Right Ear: Tympanic membrane, ear canal and external ear normal.      Left Ear: Tympanic membrane, ear canal and external ear normal.      Nose: Nose normal.      Mouth/Throat:      Mouth: Mucous " membranes are moist.      Pharynx: Oropharynx is clear. No oropharyngeal exudate or posterior oropharyngeal erythema.   Eyes:      Extraocular Movements: Extraocular movements intact.      Conjunctiva/sclera: Conjunctivae normal.      Pupils: Pupils are equal, round, and reactive to light.   Cardiovascular:      Rate and Rhythm: Normal rate and regular rhythm.      Pulses: Normal pulses.      Heart sounds: Normal heart sounds. No murmur heard.  Pulmonary:      Effort: Pulmonary effort is normal. No respiratory distress.      Breath sounds: Normal breath sounds. No wheezing or rales.   Abdominal:      General: Abdomen is flat. Bowel sounds are normal. There is no distension.      Palpations: Abdomen is soft. There is no mass.      Tenderness: There is no abdominal tenderness.   Musculoskeletal:         General: No swelling or deformity. Normal range of motion.      Cervical back: Normal range of motion and neck supple.      Right lower leg: No edema.      Left lower leg: No edema.   Lymphadenopathy:      Cervical: No cervical adenopathy.   Skin:     General: Skin is warm and dry.      Capillary Refill: Capillary refill takes less than 2 seconds.      Findings: Rash present. No lesion.   Neurological:      General: No focal deficit present.      Mental Status: He is alert and oriented to person, place, and time.      Cranial Nerves: No cranial nerve deficit.      Motor: No weakness.   Psychiatric:         Mood and Affect: Mood normal.         Behavior: Behavior normal.         Thought Content: Thought content normal.         Judgment: Judgment normal.         Assessment/Plan   Problem List Items Addressed This Visit    None  Visit Diagnoses       Cellulitis of right lower extremity        Relevant Medications    loratadine (Claritin) 10 mg tablet    cefuroxime (Ceftin) 500 mg tablet    doxycycline (Vibramycin) 100 mg capsule            Patient education provided.  Stay current with age appropriate health maintenance  as instructed.  Appointment here or ER with new or worsening symptoms'  Keep appropriate follow-up visit.  Stay current with proper immunizations   Wound care

## 2023-10-19 ENCOUNTER — OFFICE VISIT (OUTPATIENT)
Dept: PRIMARY CARE | Facility: CLINIC | Age: 58
End: 2023-10-19
Payer: COMMERCIAL

## 2023-10-19 VITALS
SYSTOLIC BLOOD PRESSURE: 108 MMHG | DIASTOLIC BLOOD PRESSURE: 73 MMHG | WEIGHT: 285 LBS | HEIGHT: 70 IN | OXYGEN SATURATION: 98 % | HEART RATE: 91 BPM | BODY MASS INDEX: 40.8 KG/M2 | RESPIRATION RATE: 16 BRPM | TEMPERATURE: 96.8 F

## 2023-10-19 DIAGNOSIS — E78.00 HYPERCHOLESTEROLEMIA: ICD-10-CM

## 2023-10-19 DIAGNOSIS — L03.115 CELLULITIS OF RIGHT LOWER EXTREMITY: Primary | ICD-10-CM

## 2023-10-19 DIAGNOSIS — E66.01 CLASS 3 SEVERE OBESITY DUE TO EXCESS CALORIES WITH SERIOUS COMORBIDITY AND BODY MASS INDEX (BMI) OF 40.0 TO 44.9 IN ADULT (MULTI): ICD-10-CM

## 2023-10-19 DIAGNOSIS — I10 BENIGN ESSENTIAL HYPERTENSION: ICD-10-CM

## 2023-10-19 DIAGNOSIS — Z12.5 SCREENING PSA (PROSTATE SPECIFIC ANTIGEN): ICD-10-CM

## 2023-10-19 DIAGNOSIS — L85.3 DRY SKIN DERMATITIS: ICD-10-CM

## 2023-10-19 PROBLEM — E66.813 CLASS 3 SEVERE OBESITY DUE TO EXCESS CALORIES WITH SERIOUS COMORBIDITY AND BODY MASS INDEX (BMI) OF 40.0 TO 44.9 IN ADULT: Status: ACTIVE | Noted: 2023-10-19

## 2023-10-19 PROBLEM — H66.91 ACUTE RIGHT OTITIS MEDIA: Status: RESOLVED | Noted: 2023-03-20 | Resolved: 2023-10-19

## 2023-10-19 PROBLEM — J00 NASOPHARYNGITIS: Status: RESOLVED | Noted: 2023-03-20 | Resolved: 2023-10-19

## 2023-10-19 PROBLEM — J34.89 NASAL CONGESTION WITH RHINORRHEA: Status: RESOLVED | Noted: 2023-03-20 | Resolved: 2023-10-19

## 2023-10-19 PROBLEM — R09.81 NASAL CONGESTION WITH RHINORRHEA: Status: RESOLVED | Noted: 2023-03-20 | Resolved: 2023-10-19

## 2023-10-19 PROBLEM — J01.40 ACUTE NON-RECURRENT PANSINUSITIS: Status: RESOLVED | Noted: 2023-03-20 | Resolved: 2023-10-19

## 2023-10-19 PROBLEM — Z20.822 SUSPECTED COVID-19 VIRUS INFECTION: Status: RESOLVED | Noted: 2023-03-20 | Resolved: 2023-10-19

## 2023-10-19 PROCEDURE — 1036F TOBACCO NON-USER: CPT | Performed by: FAMILY MEDICINE

## 2023-10-19 PROCEDURE — 99213 OFFICE O/P EST LOW 20 MIN: CPT | Performed by: FAMILY MEDICINE

## 2023-10-19 PROCEDURE — 3074F SYST BP LT 130 MM HG: CPT | Performed by: FAMILY MEDICINE

## 2023-10-19 PROCEDURE — 3008F BODY MASS INDEX DOCD: CPT | Performed by: FAMILY MEDICINE

## 2023-10-19 PROCEDURE — 3078F DIAST BP <80 MM HG: CPT | Performed by: FAMILY MEDICINE

## 2023-10-19 RX ORDER — AMMONIUM LACTATE 12 G/100G
LOTION TOPICAL
Qty: 396 G | Refills: 1 | Status: SHIPPED | OUTPATIENT
Start: 2023-10-19

## 2023-10-19 ASSESSMENT — PATIENT HEALTH QUESTIONNAIRE - PHQ9
SUM OF ALL RESPONSES TO PHQ9 QUESTIONS 1 & 2: 0
1. LITTLE INTEREST OR PLEASURE IN DOING THINGS: NOT AT ALL
2. FEELING DOWN, DEPRESSED OR HOPELESS: NOT AT ALL

## 2023-10-19 NOTE — PATIENT INSTRUCTIONS
Patient to continue current medications (with any exceptions as noted) and diet. Follow-up after the 1st of the year for annual physical, otherwise as needed.      Patient was started on:   Ammonium Lactate 12%, APPLY TOPICALLY TO SKIN AFTER SHOWERING    Rx(s) sent to pharmacy.

## 2023-10-19 NOTE — PROGRESS NOTES
"Subjective   Patient ID: Hay Miller is a 58 y.o. male who presents for Follow-up and Cellulitis. I last saw the patient on 1/10/2023.     HPI   Patient states that he is still shahbaz itchy and sore on his right leg. He finished the doses of antibiotics yesterday, Doxycycline & Ceftin.     Review of Systems  Except positives as noted in the CC & HPI      Constitutional: Denies fevers, chills, night sweats, fatigue, weight changes, change in appetite    Eyes: Denies blurry vision, double vision    ENT: Denies otalgia, trouble hearing, tinnitus, vertigo, nasal congestion, rhinorrhea, sore throat    Neck: Denies swelling, masses    Cardiovascular: Denies chest pain, palpitations, edema, orthopnea, syncope    Respiratory: Denies dyspnea, cough, wheezing, postural nocturnal dyspnea    Gastrointestinal: Denies abdominal pain, nausea, vomiting, diarrhea, constipation, melena, hematochezia    Genitourinary: Denies dysuria, hematuria, frequency, urgency    Musculoskeletal: Denies back pain, neck pain, arthralgias  Integumentary: Denies skin lesions, masses    Neurological: Denies dizziness, headaches, confusion, limb weakness, paresthesias, syncope, convulsions    Psychiatric: Denies depression, anxiety, homicidal ideations, suicidal ideations, sleep disturbances    Endocrine: Denies polyphagia, polydipsia, polyuria, weakness, hair thinning, heat intolerance, cold intolerance, weight changes    Heme/Lymph: Denies easy bruising, easy bleeding    Objective   /73   Pulse 91   Temp 36 °C (96.8 °F)   Resp 16   Ht 1.778 m (5' 10\")   Wt 129 kg (285 lb)   SpO2 98%   BMI 40.89 kg/m²     Physical Exam  General Appearance - well-developed, obese, 58 y.o., White male in no acute distress.     Skin - warm, pink and dry without rash or concerning lesions. Residual, oval area of erythema of the anterior RLE without warmth or tenderness to palpation. Inferior to that is a small residual 5 mm erythematous round area, " which could be consistent with a healing puncture wound.     Mental Status - alert and oriented x 3. Normal mood and affect appropriate to mood.     Neck - supple without lymphadenopathy. Carotid pulses are normal without bruits. Thyroid is normal in midline without nodules.     Right groin - no palpable lymph nodes noted.     Assessment/Plan   1. Cellulitis of right lower extremity  ammonium lactate (Lac-Hydrin) 12 % lotion      2. Dry skin dermatitis  ammonium lactate (Lac-Hydrin) 12 % lotion      3. Benign essential hypertension  CBC and Auto Differential    Comprehensive Metabolic Panel      4. Hypercholesterolemia  Lipid Panel      5. Class 3 severe obesity due to excess calories with serious comorbidity and body mass index (BMI) of 40.0 to 44.9 in adult (CMS/[facility])        6. Screening PSA (prostate specific antigen)  Prostate Specific Antigen, Screen      Patient to continue current medications (with any exceptions as noted) and diet. Follow-up after the 1st of the year for annual physical, otherwise as needed.      Patient is to return for fasting CBC, CMP, lipid panel, PSA labs at their convenience prior to his next appointment. Fasting is nothing to eat or drink except water or black coffee for 8-12 hours. Will call patient with results when available.     Patient was started on:   Ammonium Lactate 12%, APPLY TOPICALLY TO SKIN AFTER SHOWERING    Rx(s) sent to pharmacy.         Scribe Attestation  By signing my name below, IDominique Scribe   attest that this documentation has been prepared under the direction and in the presence of Willis Nguyen MD.

## 2023-10-27 DIAGNOSIS — E78.00 HYPERCHOLESTEROLEMIA: ICD-10-CM

## 2023-10-29 RX ORDER — ROSUVASTATIN CALCIUM 10 MG/1
TABLET, COATED ORAL
Qty: 90 TABLET | Refills: 3 | Status: SHIPPED | OUTPATIENT
Start: 2023-10-29

## 2024-01-16 ENCOUNTER — LAB (OUTPATIENT)
Dept: LAB | Facility: LAB | Age: 59
End: 2024-01-16
Payer: COMMERCIAL

## 2024-01-16 DIAGNOSIS — I10 BENIGN ESSENTIAL HYPERTENSION: ICD-10-CM

## 2024-01-16 DIAGNOSIS — Z12.5 SCREENING PSA (PROSTATE SPECIFIC ANTIGEN): ICD-10-CM

## 2024-01-16 DIAGNOSIS — E78.00 HYPERCHOLESTEROLEMIA: ICD-10-CM

## 2024-01-16 LAB
ALBUMIN SERPL BCP-MCNC: 4.3 G/DL (ref 3.4–5)
ALP SERPL-CCNC: 59 U/L (ref 33–120)
ALT SERPL W P-5'-P-CCNC: 18 U/L (ref 10–52)
ANION GAP SERPL CALC-SCNC: 12 MMOL/L (ref 10–20)
AST SERPL W P-5'-P-CCNC: 16 U/L (ref 9–39)
BASOPHILS # BLD AUTO: 0.05 X10*3/UL (ref 0–0.1)
BASOPHILS NFR BLD AUTO: 0.7 %
BILIRUB SERPL-MCNC: 0.7 MG/DL (ref 0–1.2)
BUN SERPL-MCNC: 18 MG/DL (ref 6–23)
CALCIUM SERPL-MCNC: 9.4 MG/DL (ref 8.6–10.3)
CHLORIDE SERPL-SCNC: 104 MMOL/L (ref 98–107)
CHOLEST SERPL-MCNC: 194 MG/DL (ref 0–199)
CHOLESTEROL/HDL RATIO: 4.2
CO2 SERPL-SCNC: 29 MMOL/L (ref 21–32)
CREAT SERPL-MCNC: 1.14 MG/DL (ref 0.5–1.3)
EGFRCR SERPLBLD CKD-EPI 2021: 75 ML/MIN/1.73M*2
EOSINOPHIL # BLD AUTO: 0.06 X10*3/UL (ref 0–0.7)
EOSINOPHIL NFR BLD AUTO: 0.9 %
ERYTHROCYTE [DISTWIDTH] IN BLOOD BY AUTOMATED COUNT: 12.7 % (ref 11.5–14.5)
GLUCOSE SERPL-MCNC: 105 MG/DL (ref 74–99)
HCT VFR BLD AUTO: 50.2 % (ref 41–52)
HDLC SERPL-MCNC: 46.3 MG/DL
HGB BLD-MCNC: 16.8 G/DL (ref 13.5–17.5)
IMM GRANULOCYTES # BLD AUTO: 0.03 X10*3/UL (ref 0–0.7)
IMM GRANULOCYTES NFR BLD AUTO: 0.4 % (ref 0–0.9)
LDLC SERPL CALC-MCNC: 105 MG/DL
LYMPHOCYTES # BLD AUTO: 2.5 X10*3/UL (ref 1.2–4.8)
LYMPHOCYTES NFR BLD AUTO: 37.2 %
MCH RBC QN AUTO: 30.2 PG (ref 26–34)
MCHC RBC AUTO-ENTMCNC: 33.5 G/DL (ref 32–36)
MCV RBC AUTO: 90 FL (ref 80–100)
MONOCYTES # BLD AUTO: 0.54 X10*3/UL (ref 0.1–1)
MONOCYTES NFR BLD AUTO: 8 %
NEUTROPHILS # BLD AUTO: 3.54 X10*3/UL (ref 1.2–7.7)
NEUTROPHILS NFR BLD AUTO: 52.8 %
NON HDL CHOLESTEROL: 148 MG/DL (ref 0–149)
NRBC BLD-RTO: 0 /100 WBCS (ref 0–0)
PLATELET # BLD AUTO: 262 X10*3/UL (ref 150–450)
POTASSIUM SERPL-SCNC: 4.5 MMOL/L (ref 3.5–5.3)
PROT SERPL-MCNC: 7.1 G/DL (ref 6.4–8.2)
PSA SERPL-MCNC: 0.77 NG/ML
RBC # BLD AUTO: 5.56 X10*6/UL (ref 4.5–5.9)
SODIUM SERPL-SCNC: 140 MMOL/L (ref 136–145)
TRIGL SERPL-MCNC: 214 MG/DL (ref 0–149)
VLDL: 43 MG/DL (ref 0–40)
WBC # BLD AUTO: 6.7 X10*3/UL (ref 4.4–11.3)

## 2024-01-16 PROCEDURE — 80061 LIPID PANEL: CPT

## 2024-01-16 PROCEDURE — 80053 COMPREHEN METABOLIC PANEL: CPT

## 2024-01-16 PROCEDURE — 84153 ASSAY OF PSA TOTAL: CPT

## 2024-01-16 PROCEDURE — 36415 COLL VENOUS BLD VENIPUNCTURE: CPT

## 2024-01-16 PROCEDURE — 85025 COMPLETE CBC W/AUTO DIFF WBC: CPT

## 2024-01-17 ENCOUNTER — APPOINTMENT (OUTPATIENT)
Dept: PRIMARY CARE | Facility: CLINIC | Age: 59
End: 2024-01-17
Payer: COMMERCIAL

## 2024-01-23 ENCOUNTER — OFFICE VISIT (OUTPATIENT)
Dept: PRIMARY CARE | Facility: CLINIC | Age: 59
End: 2024-01-23
Payer: COMMERCIAL

## 2024-01-23 VITALS
HEIGHT: 70 IN | SYSTOLIC BLOOD PRESSURE: 108 MMHG | WEIGHT: 293.2 LBS | RESPIRATION RATE: 18 BRPM | DIASTOLIC BLOOD PRESSURE: 64 MMHG | BODY MASS INDEX: 41.97 KG/M2 | TEMPERATURE: 97.7 F | HEART RATE: 84 BPM | OXYGEN SATURATION: 95 %

## 2024-01-23 DIAGNOSIS — E66.01 CLASS 3 SEVERE OBESITY DUE TO EXCESS CALORIES WITH SERIOUS COMORBIDITY AND BODY MASS INDEX (BMI) OF 40.0 TO 44.9 IN ADULT (MULTI): ICD-10-CM

## 2024-01-23 DIAGNOSIS — Z00.00 HEALTHCARE MAINTENANCE: Primary | ICD-10-CM

## 2024-01-23 DIAGNOSIS — Z23 NEED FOR DIPHTHERIA-TETANUS-PERTUSSIS (TDAP) VACCINE: ICD-10-CM

## 2024-01-23 DIAGNOSIS — E78.00 HYPERCHOLESTEROLEMIA: ICD-10-CM

## 2024-01-23 DIAGNOSIS — I10 BENIGN ESSENTIAL HYPERTENSION: ICD-10-CM

## 2024-01-23 DIAGNOSIS — H93.13 TINNITUS OF BOTH EARS: ICD-10-CM

## 2024-01-23 PROBLEM — R05.9 COUGH: Status: RESOLVED | Noted: 2023-03-20 | Resolved: 2024-01-23

## 2024-01-23 PROCEDURE — 3008F BODY MASS INDEX DOCD: CPT | Performed by: FAMILY MEDICINE

## 2024-01-23 PROCEDURE — 3074F SYST BP LT 130 MM HG: CPT | Performed by: FAMILY MEDICINE

## 2024-01-23 PROCEDURE — 99396 PREV VISIT EST AGE 40-64: CPT | Performed by: FAMILY MEDICINE

## 2024-01-23 PROCEDURE — 90715 TDAP VACCINE 7 YRS/> IM: CPT | Performed by: FAMILY MEDICINE

## 2024-01-23 PROCEDURE — 3078F DIAST BP <80 MM HG: CPT | Performed by: FAMILY MEDICINE

## 2024-01-23 PROCEDURE — 90471 IMMUNIZATION ADMIN: CPT | Performed by: FAMILY MEDICINE

## 2024-01-23 PROCEDURE — 1036F TOBACCO NON-USER: CPT | Performed by: FAMILY MEDICINE

## 2024-01-23 ASSESSMENT — PATIENT HEALTH QUESTIONNAIRE - PHQ9
1. LITTLE INTEREST OR PLEASURE IN DOING THINGS: NOT AT ALL
2. FEELING DOWN, DEPRESSED OR HOPELESS: NOT AT ALL
SUM OF ALL RESPONSES TO PHQ9 QUESTIONS 1 & 2: 0

## 2024-01-23 ASSESSMENT — ENCOUNTER SYMPTOMS
OCCASIONAL FEELINGS OF UNSTEADINESS: 0
LOSS OF SENSATION IN FEET: 0
DEPRESSION: 0

## 2024-01-23 NOTE — PROGRESS NOTES
"Subjective   Patient ID: Hay Miller is a 58 y.o. male who presents for Follow-up. I last saw the patient on 10/19/2023.     HPI   Patient has no medical complaints today or refill requests for rooming MA.     Patient has paperwork with him today for [employer].     He has labs to review today.     Review of Systems  Except positives as noted in the CC & HPI      Constitutional: Denies fevers, chills, night sweats, fatigue, weight changes, change in appetite    Eyes: Denies blurry vision, double vision    ENT: Denies otalgia, trouble hearing, tinnitus, vertigo, nasal congestion, rhinorrhea, sore throat    Neck: Denies swelling, masses    Cardiovascular: Denies chest pain, palpitations, edema, orthopnea, syncope    Respiratory: Denies dyspnea, cough, wheezing, postural nocturnal dyspnea    Gastrointestinal: Denies abdominal pain, nausea, vomiting, diarrhea, constipation, melena, hematochezia    Genitourinary: Denies dysuria, hematuria, frequency, urgency    Musculoskeletal: Denies back pain, neck pain, arthralgias, myalgias    Integumentary: Denies skin lesions, rashes, masses    Neurological: Denies dizziness, headaches, confusion, limb weakness, paresthesias, syncope, convulsions    Psychiatric: Denies depression, anxiety, homicidal ideations, suicidal ideations, sleep disturbances    Endocrine: Denies polyphagia, polydipsia, polyuria, weakness, hair thinning, heat intolerance, cold intolerance, weight changes    Heme/Lymph: Denies easy bruising, easy bleeding, swollen glands    Objective   /64 (BP Location: Right arm, Patient Position: Sitting)   Pulse 84   Temp 36.5 °C (97.7 °F)   Resp 18   Ht 1.778 m (5' 10\")   Wt 133 kg (293 lb 3.2 oz)   SpO2 95%   BMI 42.07 kg/m²     Physical Exam  108/64 on recheck of BP in the right arm. HR recheck was 84 bpm.      General Appearance - well-developed, obese, 58 y.o., White male in no acute distress.     Skin - warm, pink and dry without rash or " concerning lesions. Dry skin with scaling involving BLEs.     Mental Status - alert and oriented x 3. Normal mood and affect appropriate to mood.     Eyes - PERRLA. EOMI. Sclera is clear without icterus.     Ears - TMs shiny and move well with insufflation. Ear canals are clear bilaterally.     Nose - nasal passages are clear bilaterally without bleeding or nasal discharge. Significant nasal mucosal dryness, bilaterally.     Mouth - pharynx is pink without exudates. Dentition is normal appearing. Tongue and uvula move in the midline.     Neck - supple without lymphadenopathy. Carotid pulses are normal without bruits. Thyroid is normal in midline without nodules.     Chest - lungs are clear to auscultation without rales, rhonchi or wheezes.     Heart - regular, rate and rhythm without murmurs, rubs or gallops.    Abdomen - soft, morbidly obese, protuberant, nontender, nondistended. No masses, hepatomegaly or splenomegaly is noted. No rebound, rigidity or guarding is noted. Bowel sounds are normoactive.    Genitalia - normal circumcised penis. No evidence of penile discharge. Normal testicular exam without tenderness, edema or nodules. No evidence of inguinal hernias.     Extremities - no cyanosis, clubbing or edema. Pedal pulses are 2+ normal at the dorsalis pedis and posterior pulses bilaterally.     Neurological - cranial nerves II through XII are grossly intact. Motor strength 5/5 at all fours.     Lab Results   Component Value Date    ALBUMIN 4.3 01/16/2024    ALT 18 01/16/2024    AST 16 01/16/2024    BUN 18 01/16/2024    CALCIUM 9.4 01/16/2024     01/16/2024    CHOL 194 01/16/2024    CO2 29 01/16/2024    CREATININE 1.14 01/16/2024    EGFR 75 01/16/2024    GLUCOSE 105 (H) 01/16/2024    HDL 46.3 01/16/2024    HCT 50.2 01/16/2024    HGB 16.8 01/16/2024    K 4.5 01/16/2024    LDLCALC 105 (H) 01/16/2024     01/16/2024     01/16/2024    PSASCREEN 0.77 01/16/2024    TRIG 214 (H) 01/16/2024    WBC 6.7  01/16/2024     Assessment/Plan   1. Healthcare maintenance        2. Benign essential hypertension        3. Hypercholesterolemia        4. Tinnitus of both ears        5. Class 3 severe obesity due to excess calories with serious comorbidity and body mass index (BMI) of 40.0 to 44.9 in adult (CMS/[facility])        6. Need for diphtheria-tetanus-pertussis (Tdap) vaccine  Tdap vaccine, age 7 years and older  (BOOSTRIX)      Patient to continue current medications (with any exceptions as noted) and diet. Follow-up in 12 month(s) otherwise as needed.      Tdap injection given in the office today.     Patient's paperwork was completed and returned to him.     Patient was advised to limit their salt intake and to not add any extra salt to their food. Patient is to avoid pretzels, chips, lunch meats, canned soups, soda pop, ham, perez, hot dogs, etc. Patient advised to drink 64 oz of water per day, limit caffeine to no more than 1 cup per day. He is to avoid soda and take OTC [employer] Inner Ear Plus, 3 tablets daily.     He was advised to use nasal saline spray at bedtime and a cool mist vaporizer in the bedroom.         Scribe Attestation  By signing my name below, IDominique Scribe   attest that this documentation has been prepared under the direction and in the presence of Willis Nguyen MD.

## 2024-01-23 NOTE — PATIENT INSTRUCTIONS
Patient to continue current medications (with any exceptions as noted) and diet. Follow-up in 12 month(s) otherwise as needed.      Tdap injection given in the office today.     Patient's paperwork was completed and returned to him.

## 2024-02-21 DIAGNOSIS — R00.2 HEART PALPITATIONS: ICD-10-CM

## 2024-02-21 RX ORDER — METOPROLOL SUCCINATE 25 MG/1
TABLET, EXTENDED RELEASE ORAL
Qty: 90 TABLET | Refills: 3 | Status: SHIPPED | OUTPATIENT
Start: 2024-02-21

## 2024-02-21 NOTE — TELEPHONE ENCOUNTER
Recent Visits  Date Type Provider Dept   01/23/24 Office Visit Willis Nguyen MD Do [id number] [facility]   10/19/23 Office Visit Willis Nguyen MD Do [id number] [facility]   10/10/23 Office Visit Primo Torrez MD Do [id number] [facility]   10/06/23 Office Visit Primo Torrez MD Do [id number] [facility]   Showing recent visits within past 540 days and meeting all other requirements  Future Appointments  No visits were found meeting these conditions.  Showing future appointments within next 180 days and meeting all other requirements

## 2024-03-14 ENCOUNTER — HOSPITAL ENCOUNTER (EMERGENCY)
Facility: HOSPITAL | Age: 59
Discharge: HOME | End: 2024-03-14
Attending: EMERGENCY MEDICINE
Payer: COMMERCIAL

## 2024-03-14 VITALS
SYSTOLIC BLOOD PRESSURE: 158 MMHG | HEIGHT: 70 IN | OXYGEN SATURATION: 96 % | HEART RATE: 96 BPM | WEIGHT: 294.98 LBS | TEMPERATURE: 98.4 F | BODY MASS INDEX: 42.23 KG/M2 | RESPIRATION RATE: 16 BRPM | DIASTOLIC BLOOD PRESSURE: 74 MMHG

## 2024-03-14 DIAGNOSIS — L03.115 CELLULITIS OF RIGHT LEG: Primary | ICD-10-CM

## 2024-03-14 LAB
ALBUMIN SERPL BCP-MCNC: 4.5 G/DL (ref 3.4–5)
ALP SERPL-CCNC: 66 U/L (ref 33–120)
ALT SERPL W P-5'-P-CCNC: 20 U/L (ref 10–52)
ANION GAP SERPL CALC-SCNC: 12 MMOL/L (ref 10–20)
AST SERPL W P-5'-P-CCNC: 21 U/L (ref 9–39)
BASOPHILS # BLD AUTO: 0.05 X10*3/UL (ref 0–0.1)
BASOPHILS NFR BLD AUTO: 0.4 %
BILIRUB SERPL-MCNC: 0.7 MG/DL (ref 0–1.2)
BUN SERPL-MCNC: 26 MG/DL (ref 6–23)
CALCIUM SERPL-MCNC: 9.5 MG/DL (ref 8.6–10.3)
CHLORIDE SERPL-SCNC: 100 MMOL/L (ref 98–107)
CO2 SERPL-SCNC: 26 MMOL/L (ref 21–32)
CREAT SERPL-MCNC: 1.31 MG/DL (ref 0.5–1.3)
D DIMER PPP FEU-MCNC: 409 NG/ML FEU
EGFRCR SERPLBLD CKD-EPI 2021: 63 ML/MIN/1.73M*2
EOSINOPHIL # BLD AUTO: 0.03 X10*3/UL (ref 0–0.7)
EOSINOPHIL NFR BLD AUTO: 0.2 %
ERYTHROCYTE [DISTWIDTH] IN BLOOD BY AUTOMATED COUNT: 12.4 % (ref 11.5–14.5)
ERYTHROCYTE [DISTWIDTH] IN BLOOD BY AUTOMATED COUNT: 12.4 % (ref 11.5–14.5)
FLUAV RNA RESP QL NAA+PROBE: NOT DETECTED
FLUBV RNA RESP QL NAA+PROBE: NOT DETECTED
GLUCOSE SERPL-MCNC: 123 MG/DL (ref 74–99)
HCT VFR BLD AUTO: 49.5 % (ref 41–52)
HCT VFR BLD AUTO: 49.5 % (ref 41–52)
HGB BLD-MCNC: 17.1 G/DL (ref 13.5–17.5)
HGB BLD-MCNC: 17.1 G/DL (ref 13.5–17.5)
HOLD SPECIMEN: NORMAL
HOLD SPECIMEN: NORMAL
IMM GRANULOCYTES # BLD AUTO: 0.06 X10*3/UL (ref 0–0.7)
IMM GRANULOCYTES NFR BLD AUTO: 0.4 % (ref 0–0.9)
LYMPHOCYTES # BLD AUTO: 2.02 X10*3/UL (ref 1.2–4.8)
LYMPHOCYTES NFR BLD AUTO: 14.2 %
MCH RBC QN AUTO: 30.7 PG (ref 26–34)
MCH RBC QN AUTO: 30.7 PG (ref 26–34)
MCHC RBC AUTO-ENTMCNC: 34.5 G/DL (ref 32–36)
MCHC RBC AUTO-ENTMCNC: 34.5 G/DL (ref 32–36)
MCV RBC AUTO: 89 FL (ref 80–100)
MCV RBC AUTO: 89 FL (ref 80–100)
MONOCYTES # BLD AUTO: 0.95 X10*3/UL (ref 0.1–1)
MONOCYTES NFR BLD AUTO: 6.7 %
NEUTROPHILS # BLD AUTO: 11.08 X10*3/UL (ref 1.2–7.7)
NEUTROPHILS NFR BLD AUTO: 78.1 %
NRBC BLD-RTO: 0 /100 WBCS (ref 0–0)
NRBC BLD-RTO: 0 /100 WBCS (ref 0–0)
PLATELET # BLD AUTO: 270 X10*3/UL (ref 150–450)
PLATELET # BLD AUTO: 270 X10*3/UL (ref 150–450)
POTASSIUM SERPL-SCNC: 4.1 MMOL/L (ref 3.5–5.3)
PROT SERPL-MCNC: 7.6 G/DL (ref 6.4–8.2)
RBC # BLD AUTO: 5.57 X10*6/UL (ref 4.5–5.9)
RBC # BLD AUTO: 5.57 X10*6/UL (ref 4.5–5.9)
SARS-COV-2 RNA RESP QL NAA+PROBE: NOT DETECTED
SODIUM SERPL-SCNC: 134 MMOL/L (ref 136–145)
WBC # BLD AUTO: 14.3 X10*3/UL (ref 4.4–11.3)
WBC # BLD AUTO: 14.3 X10*3/UL (ref 4.4–11.3)

## 2024-03-14 PROCEDURE — 36415 COLL VENOUS BLD VENIPUNCTURE: CPT | Performed by: EMERGENCY MEDICINE

## 2024-03-14 PROCEDURE — 96366 THER/PROPH/DIAG IV INF ADDON: CPT | Performed by: EMERGENCY MEDICINE

## 2024-03-14 PROCEDURE — 2500000004 HC RX 250 GENERAL PHARMACY W/ HCPCS (ALT 636 FOR OP/ED)

## 2024-03-14 PROCEDURE — 36415 COLL VENOUS BLD VENIPUNCTURE: CPT

## 2024-03-14 PROCEDURE — 2500000001 HC RX 250 WO HCPCS SELF ADMINISTERED DRUGS (ALT 637 FOR MEDICARE OP)

## 2024-03-14 PROCEDURE — 99285 EMERGENCY DEPT VISIT HI MDM: CPT | Performed by: EMERGENCY MEDICINE

## 2024-03-14 PROCEDURE — 85027 COMPLETE CBC AUTOMATED: CPT | Performed by: EMERGENCY MEDICINE

## 2024-03-14 PROCEDURE — 2500000001 HC RX 250 WO HCPCS SELF ADMINISTERED DRUGS (ALT 637 FOR MEDICARE OP): Performed by: EMERGENCY MEDICINE

## 2024-03-14 PROCEDURE — 87040 BLOOD CULTURE FOR BACTERIA: CPT | Mod: STJLAB

## 2024-03-14 PROCEDURE — 85379 FIBRIN DEGRADATION QUANT: CPT | Performed by: EMERGENCY MEDICINE

## 2024-03-14 PROCEDURE — 80053 COMPREHEN METABOLIC PANEL: CPT | Performed by: EMERGENCY MEDICINE

## 2024-03-14 PROCEDURE — 96365 THER/PROPH/DIAG IV INF INIT: CPT | Performed by: EMERGENCY MEDICINE

## 2024-03-14 PROCEDURE — 87636 SARSCOV2 & INF A&B AMP PRB: CPT

## 2024-03-14 PROCEDURE — 99284 EMERGENCY DEPT VISIT MOD MDM: CPT | Mod: 25 | Performed by: EMERGENCY MEDICINE

## 2024-03-14 RX ORDER — CLINDAMYCIN HYDROCHLORIDE 150 MG/1
450 CAPSULE ORAL ONCE
Status: COMPLETED | OUTPATIENT
Start: 2024-03-14 | End: 2024-03-14

## 2024-03-14 RX ORDER — CEFUROXIME AXETIL 250 MG/1
500 TABLET ORAL ONCE
Status: COMPLETED | OUTPATIENT
Start: 2024-03-14 | End: 2024-03-14

## 2024-03-14 RX ORDER — VANCOMYCIN 2 GRAM/500 ML IN 0.9 % SODIUM CHLORIDE INTRAVENOUS
2000 ONCE
Status: COMPLETED | OUTPATIENT
Start: 2024-03-14 | End: 2024-03-14

## 2024-03-14 RX ORDER — ACETAMINOPHEN 325 MG/1
650 TABLET ORAL ONCE
Status: COMPLETED | OUTPATIENT
Start: 2024-03-14 | End: 2024-03-14

## 2024-03-14 RX ORDER — IBUPROFEN 600 MG/1
600 TABLET ORAL ONCE
Status: COMPLETED | OUTPATIENT
Start: 2024-03-14 | End: 2024-03-14

## 2024-03-14 RX ORDER — VANCOMYCIN HYDROCHLORIDE 1 G/20ML
INJECTION, POWDER, LYOPHILIZED, FOR SOLUTION INTRAVENOUS DAILY PRN
Status: DISCONTINUED | OUTPATIENT
Start: 2024-03-14 | End: 2024-03-14 | Stop reason: ALTCHOICE

## 2024-03-14 RX ORDER — CEFUROXIME AXETIL 500 MG/1
500 TABLET ORAL 2 TIMES DAILY
Qty: 20 TABLET | Refills: 0 | Status: SHIPPED | OUTPATIENT
Start: 2024-03-14 | End: 2024-03-24

## 2024-03-14 RX ADMIN — IBUPROFEN 600 MG: 600 TABLET, FILM COATED ORAL at 21:48

## 2024-03-14 RX ADMIN — CLINDAMYCIN HYDROCHLORIDE 450 MG: 150 CAPSULE ORAL at 20:37

## 2024-03-14 RX ADMIN — ACETAMINOPHEN 650 MG: 325 TABLET ORAL at 21:48

## 2024-03-14 RX ADMIN — Medication 2000 MG: at 20:37

## 2024-03-14 RX ADMIN — CEFUROXIME AXETIL 500 MG: 250 TABLET, FILM COATED ORAL at 21:51

## 2024-03-14 ASSESSMENT — PAIN DESCRIPTION - DESCRIPTORS: DESCRIPTORS: ACHING

## 2024-03-14 ASSESSMENT — COLUMBIA-SUICIDE SEVERITY RATING SCALE - C-SSRS
6. HAVE YOU EVER DONE ANYTHING, STARTED TO DO ANYTHING, OR PREPARED TO DO ANYTHING TO END YOUR LIFE?: NO
2. HAVE YOU ACTUALLY HAD ANY THOUGHTS OF KILLING YOURSELF?: NO
1. IN THE PAST MONTH, HAVE YOU WISHED YOU WERE DEAD OR WISHED YOU COULD GO TO SLEEP AND NOT WAKE UP?: NO

## 2024-03-14 ASSESSMENT — PAIN SCALES - GENERAL
PAINLEVEL_OUTOF10: 3
PAINLEVEL_OUTOF10: 2
PAINLEVEL_OUTOF10: 4
PAINLEVEL_OUTOF10: 2

## 2024-03-14 ASSESSMENT — PAIN DESCRIPTION - ORIENTATION
ORIENTATION: RIGHT
ORIENTATION: RIGHT

## 2024-03-14 ASSESSMENT — PAIN - FUNCTIONAL ASSESSMENT
PAIN_FUNCTIONAL_ASSESSMENT: 0-10
PAIN_FUNCTIONAL_ASSESSMENT: 0-10

## 2024-03-14 ASSESSMENT — PAIN DESCRIPTION - LOCATION
LOCATION: LEG
LOCATION: LEG

## 2024-03-14 NOTE — ED PROVIDER NOTES
EMERGENCY DEPARTMENT ENCOUNTER      Pt Name: Hay Miller  MRN: [id number]  Birthdate 1965  Date of evaluation: 3/14/2024  Provider: Roldan De DO    CHIEF COMPLAINT       Chief Complaint   Patient presents with    Leg Swelling     Right leg         HISTORY OF PRESENT ILLNESS    ZACHRAY Romero is a 59-year-old male who presents to the ED with complaint of cellulitis of the right lower extremity.  Medical history is significant for cellulitis, hyperlipidemia, hypertension, and obesity.  Patient stated that his symptoms started around 5 PM with headache, cough, nausea, right lower back pain, and right groin pain. He denies fever, dizziness, lightheadedness, shortness of breath, chest pain, abdominal pain, diarrhea, or urinary symptoms.        Nursing Notes were reviewed.    PAST MEDICAL HISTORY   History reviewed. No pertinent past medical history.      SURGICAL HISTORY       Past Surgical History:   Procedure Laterality Date    OTHER SURGICAL HISTORY  07/09/2019    Cyst excision    OTHER SURGICAL HISTORY  07/09/2019    Carpal tunnel surgery    OTHER SURGICAL HISTORY  07/19/2019    Tonsillectomy with adenoidectomy    OTHER SURGICAL HISTORY  02/13/2020    Hernia repair         CURRENT MEDICATIONS       Discharge Medication List as of 3/14/2024 10:31 PM        CONTINUE these medications which have NOT CHANGED    Details   ammonium lactate (Lac-Hydrin) 12 % lotion Apply topically to skin after showering., Normal      azithromycin (Zithromax) 250 mg tablet Take by mouth., Starting Sat 4/16/2022, Historical Med      cholecalciferol (Vitamin D-3) 25 MCG (1000 UT) capsule TAKE AS DIRECTED., Historical Med      cyanocobalamin (Vitamin B-12) 500 mcg tablet Take by mouth., Historical Med      guaiFENesin (Mucinex) 600 mg 12 hr tablet Take by mouth every 12 hours if needed., Historical Med      lisinopril 5 mg tablet TAKE 1 TABLET DAILY (DOSE CHANGE), Starting Fri 8/18/2023, Normal      loratadine (Claritin) 10  mg tablet Take 1 tablet (10 mg) by mouth once daily., Starting Tue 10/10/2023, Normal      loratadine-pseudoephedrine (Claritin-D 24 Hour)  mg 24 hr tablet Take 1 tablet by mouth once daily. Do not crush, chew, or split., Starting Tue 10/10/2023, Until Wed 10/9/2024, Normal      metoprolol succinate XL (Toprol-XL) 25 mg 24 hr tablet TAKE 1 TABLET DAILY, Normal      rosuvastatin (Crestor) 10 mg tablet TAKE 1 TABLET AT BEDTIME, Normal      scopolamine (Transderm-Scop) 1 mg over 3 days patch 3 day APPLY BEHIND THE EAR 4 HOURS PRIOR TO TRAVEL AND CHANGE EVERY 3 DAYS, Historical Med      zinc gluconate 100 mg tablet Take 1 tablet (100 mg) by mouth once daily., Starting Tue 1/11/2022, Historical Med             ALLERGIES     Penicillins    FAMILY HISTORY       Family History   Problem Relation Name Age of Onset    Breast cancer Mother      Diabetes Father      Diabetes Mother's Sister      Stroke Paternal Grandmother            SOCIAL HISTORY       Social History     Socioeconomic History    Marital status:      Spouse name: None    Number of children: None    Years of education: None    Highest education level: None   Occupational History    None   Tobacco Use    Smoking status: Never    Smokeless tobacco: Never   Substance and Sexual Activity    Alcohol use: None    Drug use: Never    Sexual activity: None   Other Topics Concern    None   Social History Narrative    None     Social Determinants of Health     Financial Resource Strain: Not on file   Food Insecurity: Not on file   Transportation Needs: Not on file   Physical Activity: Not on file   Stress: Not on file   Social Connections: Not on file   Intimate Partner Violence: Not on file   Housing Stability: Not on file       SCREENINGS                        PHYSICAL EXAM    (up to 7 for level 4, 8 or more for level 5)     ED Triage Vitals [03/14/24 1933]   Temperature Heart Rate Respirations BP   37.2 °C (99 °F) 85 20 (!) 178/91      Pulse Ox Temp  Source Heart Rate Source Patient Position   98 % Temporal Monitor Sitting      BP Location FiO2 (%)     Right arm --       Physical Exam  Vitals reviewed.   Constitutional:       General: He is not in acute distress.     Appearance: Normal appearance. He is obese. He is not ill-appearing or toxic-appearing.   HENT:      Head: Normocephalic and atraumatic.      Right Ear: External ear normal.      Left Ear: External ear normal.      Nose: Nose normal. No congestion or rhinorrhea.      Mouth/Throat:      Pharynx: Oropharynx is clear. No oropharyngeal exudate or posterior oropharyngeal erythema.   Eyes:      General: No scleral icterus.        Right eye: No discharge.         Left eye: No discharge.      Extraocular Movements: Extraocular movements intact.      Conjunctiva/sclera: Conjunctivae normal.      Pupils: Pupils are equal, round, and reactive to light.   Cardiovascular:      Rate and Rhythm: Normal rate and regular rhythm.      Pulses: Normal pulses.      Heart sounds: Normal heart sounds. No murmur heard.     No friction rub. No gallop.   Pulmonary:      Effort: Pulmonary effort is normal. No respiratory distress.      Breath sounds: Normal breath sounds. No stridor. No wheezing, rhonchi or rales.   Chest:      Chest wall: No tenderness.   Abdominal:      General: Abdomen is flat. Bowel sounds are normal. There is distension.      Palpations: Abdomen is soft. There is no mass.      Tenderness: There is no abdominal tenderness. There is no guarding or rebound.   Musculoskeletal:         General: No swelling, deformity or signs of injury.      Cervical back: Normal range of motion and neck supple. No rigidity.      Right lower leg: No edema.      Left lower leg: No edema.   Skin:     General: Skin is warm.      Capillary Refill: Capillary refill takes less than 2 seconds.      Coloration: Skin is not jaundiced or pale.      Findings: Erythema (Irregular erythematous rash 14 cm lenght and 7 cm width on the Right  middle anterior tibia) and rash (Right middle anterior tibia) present. No bruising or lesion.   Neurological:      General: No focal deficit present.      Mental Status: He is alert and oriented to person, place, and time.      Sensory: No sensory deficit.      Motor: No weakness.   Psychiatric:         Mood and Affect: Mood normal.         Behavior: Behavior normal.         Thought Content: Thought content normal.              DIAGNOSTIC RESULTS     LABS:  Labs Reviewed   CBC - Abnormal       Result Value    WBC 14.3 (*)     nRBC 0.0      RBC 5.57      Hemoglobin 17.1      Hematocrit 49.5      MCV 89      MCH 30.7      MCHC 34.5      RDW 12.4      Platelets 270     COMPREHENSIVE METABOLIC PANEL - Abnormal    Glucose 123 (*)     Sodium 134 (*)     Potassium 4.1      Chloride 100      Bicarbonate 26      Anion Gap 12      Urea Nitrogen 26 (*)     Creatinine 1.31 (*)     eGFR 63      Calcium 9.5      Albumin 4.5      Alkaline Phosphatase 66      Total Protein 7.6      AST 21      Bilirubin, Total 0.7      ALT 20     CBC WITH AUTO DIFFERENTIAL - Abnormal    WBC 14.3 (*)     nRBC 0.0      RBC 5.57      Hemoglobin 17.1      Hematocrit 49.5      MCV 89      MCH 30.7      MCHC 34.5      RDW 12.4      Platelets 270      Neutrophils % 78.1      Immature Granulocytes %, Automated 0.4      Lymphocytes % 14.2      Monocytes % 6.7      Eosinophils % 0.2      Basophils % 0.4      Neutrophils Absolute 11.08 (*)     Immature Granulocytes Absolute, Automated 0.06      Lymphocytes Absolute 2.02      Monocytes Absolute 0.95      Eosinophils Absolute 0.03      Basophils Absolute 0.05     BLOOD CULTURE - Normal    Blood Culture No growth at 1 day     BLOOD CULTURE - Normal    Blood Culture No growth at 1 day     D-DIMER, VTE EXCLUSION - Normal    D-Dimer, Quantitative VTE Exclusion 409      Narrative:     The VTE Exclusion D-Dimer assay is reported in ng/mL Fibrinogen Equivalent Units (FEU).    Per 's instructions for use, a  value of less than 500 ng/mL (FEU) may help to exclude DVT or PE in outpatients when the assay is used with a clinical pretest probability assessment.([facility] must utilize and document eCalc 'Wells Score Deep Vein Thrombosis Risk' for DVT exclusion only. Emergency Department should utilize  Guidelines for Emergency Department Use of the VTE Exclusion D-Dimer and Clinical Pretest probability assessment model for DVT or PE exclusion.)         MARKED LIPEMIA DETECTED. The result may be falsely elevated due to lipemia or other interferents. Clinical correlation is recommended. Repeat testing may be considered.     SARS-COV-2 AND INFLUENZA A/B PCR - Normal    Flu A Result Not Detected      Flu B Result Not Detected      Coronavirus 2019, PCR Not Detected      Narrative:     This assay has received FDA Emergency Use Authorization (EUA) and  is only authorized for the duration of time that circumstances exist to justify the authorization of the emergency use of in vitro diagnostic tests for the detection of SARS-CoV-2 virus and/or diagnosis of COVID-19 infection under section 564(b)(1) of the Act, 21 U.S.C. 360bbb-3(b)(1). Testing for SARS-CoV-2 is only recommended for patients who meet current clinical and/or epidemiological criteria as defined by federal, state, or local public health directives. This assay is an in vitro diagnostic nucleic acid amplification test for the qualitative detection of SARS-CoV-2, Influenza A, and Influenza B from nasopharyngeal specimens and has been validated for use at [facility]. Negative results do not preclude COVID-19 infections or Influenza A/B infections, and should not be used as the sole basis for diagnosis, treatment, or other management decisions. If Influenza A/B and RSV PCR results are negative, testing for Parainfluenza virus, Adenovirus and Metapneumovirus is routinely performed for [facility] pediatric oncology and intensive care inpatients, and is available  on other patients by placing an add-on request.        All other labs were within normal range or not returned as of this dictation.    Imaging  No orders to display        Procedures  Procedures     EMERGENCY DEPARTMENT COURSE/MDM:     Diagnoses as of 03/16/24 1401   Cellulitis of right leg        Medical Decision Making  Patient is a 59-year-old male who presents to the ED with complaint of cellulitis of the right lower extremity.  Will order basic labs, with D-dimer.  The patient was started on clindamycin and vancomycin for empirical treatment.  Lab results shows CBC leukocytosis, with white blood count of 14.3.  Chemistry panel shows acute kidney injury without significant electrolyte abnormality.  D-dimer result is within normal ranges.  Patient tested negative for COVID, and flu viruses.      Case was discussed with the attending, Dr. De who saw and evaluated the patient at the bedside.  On reassessment, the patient maintained stable vital signs.  He will be discharged with a prescription for Ceftin for 10 days twice daily 500 mg and instruction to follow-up with his primary care physician regarding his ED visit.  Patient was discharged in stable condition.        Patient and or family in agreement and understanding of treatment plan.  All questions answered.      I reviewed the case with the attending ED physician. The attending ED physician agrees with the plan. Patient and/or patient´s representative was counseled regarding labs, imaging, likely diagnosis, and plan. All questions were answered.    ED Medications administered this visit:    Medications   clindamycin (Cleocin) capsule 450 mg (450 mg oral Given 3/14/24 2037)   vancomycin (Vancocin) in sodium chloride 0.9% 500 mL IV 2,000 mg (0 mg intravenous Stopped 3/14/24 2237)   ibuprofen tablet 600 mg (600 mg oral Given 3/14/24 2148)   acetaminophen (Tylenol) tablet 650 mg (650 mg oral Given 3/14/24 2148)   cefuroxime (Ceftin) tablet 500 mg (500 mg  oral Given 3/14/24 2151)       New Prescriptions from this visit:    Discharge Medication List as of 3/14/2024 10:31 PM        START taking these medications    Details   cefuroxime (Ceftin) 500 mg tablet Take 1 tablet (500 mg) by mouth 2 times a day for 10 days., Starting Thu 3/14/2024, Until Sun 3/24/2024, Normal             Follow-up:  Willis Nguyen MD  [address]  [place] [id number]  [phone]    Schedule an appointment as soon as possible for a visit in 2 days  As needed        Final Impression:   1. Cellulitis of right leg          (Please note that portions of this note were completed with a voice recognition program.  Efforts were made to edit the dictations but occasionally words are mis-transcribed.)     Natty Mast MD  Resident  03/14/24 8838    The patient was seen by the resident/fellow.  I have personally performed a substantive portion of the encounter.  I have seen and examined the patient; agree with the workup, evaluation, MDM, management and diagnosis.  The care plan has been discussed with the resident; I have reviewed the resident’s note and agree with the documented findings.      The patient has had cellulitis in this leg in the past.  He states the first time it happened in 1 day started around the shin and traveled up the leg and the entire leg was red.  This time only waited about 1 or 2 hours before he came in.  The area of redness is 14 x 7 cm in the anterior shin.  After 2-1/2 to 3 hours in the emergency room there has been no spreading of the redness outside of the line that was drawn.  The patient received IV antibiotics, and will be switched over to oral antibiotics as he does not appear to be septic.  I notified the patient he has a slight elevation in his white count but otherwise he is to continue on the oral antibiotics and return to the emergency room for any increasing redness or pain, swelling, fevers and chills, vomiting, or any worsening concerning  symptoms.  He states he will come back otherwise follow-up with primary care doctor in 7 days.     Roldan De, DO  03/16/24 [id number]

## 2024-03-15 NOTE — DISCHARGE INSTRUCTIONS
Take medicine at home as prescribed. See immediate medical attention if you have if you experience worsening symptom, new onset of fever, expanding redness in your right leg, increased pain, numbness, or tingling in your right leg. Follow up with your primary care physician within 48 hours regarding your ED visit.

## 2024-03-15 NOTE — CONSULTS
"Vancomycin Dosing by Pharmacy - Emergency Department    Hay Miller is a 59 y.o. male who pharmacy has been consulted for vancomycin one-time dosing for cellulitis, skin and soft tissue by an Emergency Department (ED) provider.    Estimated Creatinine Clearance: 85.7 mL/min (A) (by C-G formula based on SCr of 1.31 mg/dL (H)).    HD/PD/ASHLEY: No    Blood pressure 137/87, pulse 90, temperature 37.6 °C (99.7 °F), temperature source Temporal, resp. rate 16, height 1.778 m (5' 10\"), weight 134 kg (294 lb 15.6 oz), SpO2 97 %.    Concern for Severe Sepsis and/or Septic Shock: Yes    Assessment/Plan:  Patient will not be given a loading dose.   Will initiate vancomycin ED dose, 2000 mg (15 mg/kg) x 1 dose.  Pharmacy will now discontinue the one time dose consult. Maintenance dose and continuation of vancomycin to be determined by the admitting team. If vancomycin appropriate for continuation, another pharmacy to dose vancomycin consult will be placed at that time.      Thank you for allowing me to take part in the care of this patient. Please contact pharmacy with any questions.    Mayte Gay, PharmD   3/14/2024 8:22 PM                "

## 2024-03-19 LAB
BACTERIA BLD CULT: NORMAL
BACTERIA BLD CULT: NORMAL

## 2024-04-11 ENCOUNTER — OFFICE VISIT (OUTPATIENT)
Dept: PRIMARY CARE | Facility: CLINIC | Age: 59
End: 2024-04-11
Payer: COMMERCIAL

## 2024-04-11 VITALS
RESPIRATION RATE: 16 BRPM | TEMPERATURE: 97.2 F | HEIGHT: 70 IN | DIASTOLIC BLOOD PRESSURE: 84 MMHG | SYSTOLIC BLOOD PRESSURE: 127 MMHG | OXYGEN SATURATION: 95 % | WEIGHT: 295.4 LBS | HEART RATE: 77 BPM | BODY MASS INDEX: 42.29 KG/M2

## 2024-04-11 DIAGNOSIS — H10.11 ALLERGIC CONJUNCTIVITIS OF RIGHT EYE: Primary | ICD-10-CM

## 2024-04-11 DIAGNOSIS — S05.8X1A ABRASION OF SCLERA OF RIGHT EYE, INITIAL ENCOUNTER: ICD-10-CM

## 2024-04-11 DIAGNOSIS — J30.2 SEASONAL ALLERGIES: ICD-10-CM

## 2024-04-11 PROCEDURE — 99214 OFFICE O/P EST MOD 30 MIN: CPT | Performed by: NURSE PRACTITIONER

## 2024-04-11 RX ORDER — OLOPATADINE HYDROCHLORIDE 1 MG/ML
1 SOLUTION/ DROPS OPHTHALMIC 2 TIMES DAILY
Qty: 5 ML | Refills: 0 | Status: SHIPPED | OUTPATIENT
Start: 2024-04-11 | End: 2025-04-11

## 2024-04-11 RX ORDER — POLYMYXIN B SULFATE AND TRIMETHOPRIM 1; 10000 MG/ML; [USP'U]/ML
1 SOLUTION OPHTHALMIC
Qty: 10 ML | Refills: 0 | Status: SHIPPED | OUTPATIENT
Start: 2024-04-11 | End: 2024-04-18

## 2024-04-11 ASSESSMENT — ENCOUNTER SYMPTOMS
SINUS PAIN: 0
CHILLS: 0
SINUS PRESSURE: 0
EYE ITCHING: 1
DIZZINESS: 0
ACTIVITY CHANGE: 0
RHINORRHEA: 1
PHOTOPHOBIA: 0
EYE PAIN: 0
PALPITATIONS: 0
WHEEZING: 0
EYE REDNESS: 1
FATIGUE: 0
HEADACHES: 0
DIAPHORESIS: 0
FEVER: 0
EYE DISCHARGE: 1
COUGH: 0
SORE THROAT: 0

## 2024-04-11 ASSESSMENT — PATIENT HEALTH QUESTIONNAIRE - PHQ9
SUM OF ALL RESPONSES TO PHQ9 QUESTIONS 1 AND 2: 0
1. LITTLE INTEREST OR PLEASURE IN DOING THINGS: NOT AT ALL
2. FEELING DOWN, DEPRESSED OR HOPELESS: NOT AT ALL

## 2024-04-11 NOTE — PROGRESS NOTES
"Subjective   Patient ID: Hay Miller is a 59 y.o. male who presents for Conjunctivitis.    HPI   Symptoms: pink red itchy draining eye , dryness woke up with crusty discharge this morning.  Length of symptoms: 3 days ago  OTC: none  Related information: Reports that he has been itching his eyes excessively.       Review of Systems   Constitutional:  Negative for activity change, chills, diaphoresis, fatigue and fever.   HENT:  Positive for rhinorrhea. Negative for congestion, ear discharge, ear pain, postnasal drip, sinus pressure, sinus pain and sore throat.    Eyes:  Positive for discharge, redness and itching. Negative for photophobia, pain and visual disturbance.   Respiratory:  Negative for cough and wheezing.    Cardiovascular:  Negative for chest pain and palpitations.   Neurological:  Negative for dizziness and headaches.       Objective   /84 Comment: auto  Pulse 77   Temp 36.2 °C (97.2 °F)   Resp 16   Ht 1.778 m (5' 10\")   Wt 134 kg (295 lb 6.4 oz)   SpO2 95%   BMI 42.39 kg/m²     Physical Exam  Vitals and nursing note reviewed.   Constitutional:       Appearance: Normal appearance. He is obese.   HENT:      Head: Normocephalic.      Right Ear: Tympanic membrane, ear canal and external ear normal.      Left Ear: Tympanic membrane, ear canal and external ear normal.   Eyes:      General: No scleral icterus.        Right eye: Discharge present.         Left eye: No discharge.      Comments: Mild erythema of right eye, excessive watering bilaterally.    Cardiovascular:      Rate and Rhythm: Normal rate and regular rhythm.      Pulses: Normal pulses.      Heart sounds: Normal heart sounds.   Pulmonary:      Effort: Pulmonary effort is normal.      Breath sounds: Normal breath sounds.   Skin:     General: Skin is warm and dry.   Neurological:      Mental Status: He is alert and oriented to person, place, and time.   Psychiatric:         Mood and Affect: Mood normal.         Behavior: " Behavior normal.       Assessment/Plan   Problem List Items Addressed This Visit    None  Visit Diagnoses         Codes    Allergic conjunctivitis of right eye    -  Primary H10.11    Relevant Medications    olopatadine (Patanol) 0.1 % ophthalmic solution    Seasonal allergies     J30.2    Relevant Medications    olopatadine (Patanol) 0.1 % ophthalmic solution    Abrasion of sclera of right eye, initial encounter     S05.8X1A    Relevant Medications    polymyxin B sulf-trimethoprim (Polytrim) ophthalmic solution          Discussed diagnosis, treatment and anticipated course of illness with the patient who verbalized understanding. Discussed initially starting Pataday drops today and tomorrow prior to the initiation of Polytrim eye drops. Discussed watchful waiting and when to initiate Polytrim eye drops if eyes are excessively draining and reddened that's does not improve throughout the day. Instructed to take medications as prescribed. Follow up with primary care provider in the event signs and symptoms worsen or fail to improve with treatment plan.

## 2024-04-25 ENCOUNTER — APPOINTMENT (OUTPATIENT)
Dept: PRIMARY CARE | Facility: CLINIC | Age: 59
End: 2024-04-25
Payer: COMMERCIAL

## 2024-09-17 ENCOUNTER — OFFICE VISIT (OUTPATIENT)
Dept: PRIMARY CARE | Facility: CLINIC | Age: 59
End: 2024-09-17
Payer: COMMERCIAL

## 2024-09-17 VITALS
OXYGEN SATURATION: 96 % | RESPIRATION RATE: 20 BRPM | WEIGHT: 291.4 LBS | TEMPERATURE: 97.3 F | BODY MASS INDEX: 41.81 KG/M2 | HEART RATE: 90 BPM | SYSTOLIC BLOOD PRESSURE: 128 MMHG | DIASTOLIC BLOOD PRESSURE: 82 MMHG

## 2024-09-17 DIAGNOSIS — R05.9 COUGH, UNSPECIFIED TYPE: ICD-10-CM

## 2024-09-17 DIAGNOSIS — L03.115 CELLULITIS OF RIGHT LOWER EXTREMITY: ICD-10-CM

## 2024-09-17 DIAGNOSIS — J06.9 ACUTE URI: Primary | ICD-10-CM

## 2024-09-17 LAB
POC BINAX EXPIRATION: NORMAL
POC BINAX NOW COVID SERIAL NUMBER: NORMAL
POC SARS-COV-2 AG BINAX: NORMAL

## 2024-09-17 PROCEDURE — 1036F TOBACCO NON-USER: CPT | Performed by: NURSE PRACTITIONER

## 2024-09-17 PROCEDURE — 99213 OFFICE O/P EST LOW 20 MIN: CPT | Performed by: NURSE PRACTITIONER

## 2024-09-17 PROCEDURE — 87635 SARS-COV-2 COVID-19 AMP PRB: CPT

## 2024-09-17 PROCEDURE — 87811 SARS-COV-2 COVID19 W/OPTIC: CPT | Performed by: NURSE PRACTITIONER

## 2024-09-17 PROCEDURE — 3079F DIAST BP 80-89 MM HG: CPT | Performed by: NURSE PRACTITIONER

## 2024-09-17 PROCEDURE — 3074F SYST BP LT 130 MM HG: CPT | Performed by: NURSE PRACTITIONER

## 2024-09-17 RX ORDER — FLUTICASONE PROPIONATE 50 MCG
1 SPRAY, SUSPENSION (ML) NASAL DAILY
Qty: 16 G | Refills: 0 | Status: SHIPPED | OUTPATIENT
Start: 2024-09-17 | End: 2025-09-17

## 2024-09-17 RX ORDER — AZITHROMYCIN 250 MG/1
TABLET, FILM COATED ORAL
Qty: 6 TABLET | Refills: 0 | Status: SHIPPED | OUTPATIENT
Start: 2024-09-17 | End: 2024-09-22

## 2024-09-17 ASSESSMENT — ENCOUNTER SYMPTOMS
RHINORRHEA: 1
COUGH: 1
FEVER: 0
WHEEZING: 1
VOMITING: 0
SORE THROAT: 0
DIARRHEA: 0
HEADACHES: 1
ACTIVITY CHANGE: 0
APPETITE CHANGE: 0
CONSTIPATION: 0
SLEEP DISTURBANCE: 1
SINUS PRESSURE: 1
NAUSEA: 0
FATIGUE: 0

## 2024-09-17 NOTE — PROGRESS NOTES
Subjective   Patient ID: Hay Miller is a 59 y.o. male who presents for Cough (Congestion/3-4 days/3rd time being sick since July).    Cold symptoms x3-4 days  Has been sick on and off since July  Nasal congestion  Nasal drainage  Cough; productive  Wheezing at night  Body aches  No sore throat  No fever or chills  Ears feel full  No GI Issues    Wife is sick  Got back yesterday from a [place]/ [place] cruise    OTC- Mucinex           Cough  This is a new problem. Episode onset: 3/4 DAYS. The problem has been gradually worsening. The problem occurs constantly. The cough is Productive of sputum (yellowish). Associated symptoms include chest pain, ear congestion, ear pain, headaches, nasal congestion, postnasal drip, rhinorrhea and wheezing. Pertinent negatives include no fever or sore throat. Associated symptoms comments: Chest burning. Treatments tried: mucinex. The treatment provided mild relief.        Review of Systems   Constitutional:  Negative for activity change, appetite change, fatigue and fever.   HENT:  Positive for congestion, ear pain, postnasal drip, rhinorrhea and sinus pressure. Negative for sore throat.    Respiratory:  Positive for cough and wheezing.    Cardiovascular:  Positive for chest pain.   Gastrointestinal:  Negative for constipation, diarrhea, nausea and vomiting.   Neurological:  Positive for headaches.   Psychiatric/Behavioral:  Positive for sleep disturbance.        Objective   /82   Pulse 90   Temp 36.3 °C (97.3 °F)   Resp 20   Wt 132 kg (291 lb 6.4 oz)   SpO2 96%   BMI 41.81 kg/m²     Physical Exam  Vitals reviewed.   Constitutional:       Appearance: Normal appearance.   HENT:      Head: Normocephalic.      Right Ear: Tympanic membrane, ear canal and external ear normal.      Left Ear: Tympanic membrane, ear canal and external ear normal.      Nose: Mucosal edema and congestion present.      Right Turbinates: Swollen.      Left Turbinates: Swollen.      Mouth/Throat:       Lips: Pink.      Mouth: Mucous membranes are moist.      Pharynx: Posterior oropharyngeal erythema and postnasal drip present.   Eyes:      Extraocular Movements: Extraocular movements intact.      Conjunctiva/sclera: Conjunctivae normal.      Pupils: Pupils are equal, round, and reactive to light.   Cardiovascular:      Rate and Rhythm: Normal rate and regular rhythm.      Pulses: Normal pulses.      Heart sounds: Normal heart sounds.   Pulmonary:      Effort: Pulmonary effort is normal.      Breath sounds: Normal breath sounds.   Musculoskeletal:      Cervical back: Normal range of motion and neck supple.   Skin:     General: Skin is warm and dry.      Capillary Refill: Capillary refill takes less than 2 seconds.   Neurological:      General: No focal deficit present.      Mental Status: He is alert and oriented to person, place, and time.   Psychiatric:         Mood and Affect: Mood normal.         Behavior: Behavior normal.         Assessment/Plan   Problem List Items Addressed This Visit    None  Visit Diagnoses         Codes    Acute URI    -  Primary J06.9    Relevant Medications    fluticasone (Flonase) 50 mcg/actuation nasal spray    azithromycin (Zithromax) 250 mg tablet    Cough, unspecified type     R05.9    Relevant Orders    POCT BinaxNOW Covid-19 Ag Card manually resulted    Sars-CoV-2 PCR    Cellulitis of right lower extremity     L03.115          IO Covid negative; PCR to be sent  Encouraged daily use of Flonase and Claritin for symptom support  Antibiotic sent in for acute URI, can start if not improving with supportive care measures and all testing is negative  Increase hydration  Follow up with PCP if not improving over the next 3-4 days  ER for any SOB, difficulty breathing, uncontrolled fevers or worsening of symptoms

## 2024-09-18 LAB — SARS-COV-2 RNA RESP QL NAA+PROBE: NOT DETECTED

## 2024-10-22 DIAGNOSIS — E78.00 HYPERCHOLESTEROLEMIA: ICD-10-CM

## 2024-10-22 DIAGNOSIS — I10 BENIGN ESSENTIAL HYPERTENSION: ICD-10-CM

## 2024-10-22 RX ORDER — ROSUVASTATIN CALCIUM 10 MG/1
TABLET, COATED ORAL
Qty: 90 TABLET | Refills: 3 | Status: SHIPPED | OUTPATIENT
Start: 2024-10-22

## 2024-10-22 RX ORDER — LISINOPRIL 5 MG/1
5 TABLET ORAL DAILY
Qty: 90 TABLET | Refills: 3 | Status: SHIPPED | OUTPATIENT
Start: 2024-10-22

## 2024-11-09 ENCOUNTER — HOSPITAL ENCOUNTER (EMERGENCY)
Facility: HOSPITAL | Age: 59
Discharge: HOME | End: 2024-11-09
Attending: STUDENT IN AN ORGANIZED HEALTH CARE EDUCATION/TRAINING PROGRAM
Payer: COMMERCIAL

## 2024-11-09 VITALS
SYSTOLIC BLOOD PRESSURE: 138 MMHG | WEIGHT: 285 LBS | BODY MASS INDEX: 40.8 KG/M2 | OXYGEN SATURATION: 96 % | TEMPERATURE: 97.7 F | DIASTOLIC BLOOD PRESSURE: 89 MMHG | RESPIRATION RATE: 18 BRPM | HEIGHT: 70 IN | HEART RATE: 70 BPM

## 2024-11-09 DIAGNOSIS — L03.116 CELLULITIS OF LEFT LOWER EXTREMITY: Primary | ICD-10-CM

## 2024-11-09 PROCEDURE — 99283 EMERGENCY DEPT VISIT LOW MDM: CPT

## 2024-11-09 PROCEDURE — 99284 EMERGENCY DEPT VISIT MOD MDM: CPT | Performed by: STUDENT IN AN ORGANIZED HEALTH CARE EDUCATION/TRAINING PROGRAM

## 2024-11-09 PROCEDURE — 2500000001 HC RX 250 WO HCPCS SELF ADMINISTERED DRUGS (ALT 637 FOR MEDICARE OP)

## 2024-11-09 RX ORDER — CEPHALEXIN 500 MG/1
500 CAPSULE ORAL ONCE
Status: COMPLETED | OUTPATIENT
Start: 2024-11-09 | End: 2024-11-09

## 2024-11-09 RX ORDER — CEPHALEXIN 500 MG/1
500 CAPSULE ORAL 2 TIMES DAILY
Qty: 20 CAPSULE | Refills: 0 | Status: SHIPPED | OUTPATIENT
Start: 2024-11-09 | End: 2024-11-19

## 2024-11-09 RX ADMIN — CEPHALEXIN 500 MG: 500 CAPSULE ORAL at 05:01

## 2024-11-09 ASSESSMENT — COLUMBIA-SUICIDE SEVERITY RATING SCALE - C-SSRS
2. HAVE YOU ACTUALLY HAD ANY THOUGHTS OF KILLING YOURSELF?: NO
6. HAVE YOU EVER DONE ANYTHING, STARTED TO DO ANYTHING, OR PREPARED TO DO ANYTHING TO END YOUR LIFE?: NO
1. IN THE PAST MONTH, HAVE YOU WISHED YOU WERE DEAD OR WISHED YOU COULD GO TO SLEEP AND NOT WAKE UP?: NO

## 2024-11-09 ASSESSMENT — PAIN - FUNCTIONAL ASSESSMENT
PAIN_FUNCTIONAL_ASSESSMENT: 0-10
PAIN_FUNCTIONAL_ASSESSMENT: 0-10

## 2024-11-09 ASSESSMENT — PAIN DESCRIPTION - ONSET: ONSET: SUDDEN

## 2024-11-09 ASSESSMENT — PAIN DESCRIPTION - ORIENTATION
ORIENTATION: LEFT
ORIENTATION: LEFT

## 2024-11-09 ASSESSMENT — PAIN DESCRIPTION - FREQUENCY
FREQUENCY: CONSTANT/CONTINUOUS
FREQUENCY: CONSTANT/CONTINUOUS

## 2024-11-09 ASSESSMENT — PAIN DESCRIPTION - LOCATION: LOCATION: LEG

## 2024-11-09 ASSESSMENT — PAIN DESCRIPTION - PROGRESSION
CLINICAL_PROGRESSION: NOT CHANGED
CLINICAL_PROGRESSION: GRADUALLY IMPROVING

## 2024-11-09 ASSESSMENT — PAIN DESCRIPTION - DESCRIPTORS
DESCRIPTORS: ACHING
DESCRIPTORS: SHARP
DESCRIPTORS: SHARP

## 2024-11-09 ASSESSMENT — PAIN SCALES - GENERAL
PAINLEVEL_OUTOF10: 5 - MODERATE PAIN
PAINLEVEL_OUTOF10: 6

## 2024-11-09 ASSESSMENT — PAIN DESCRIPTION - PAIN TYPE: TYPE: ACUTE PAIN

## 2024-11-09 NOTE — ED PROVIDER NOTES
EMERGENCY DEPARTMENT ENCOUNTER      Pt Name: Hay Miller  MRN: [id number]  Birthdate 1965  Date of evaluation: 11/9/2024  Provider: Kaiden Rosario DO    CHIEF COMPLAINT       Chief Complaint   Patient presents with    Leg Pain     Pt c/o left leg pain and swelling          HISTORY OF PRESENT ILLNESS    This is a 59-year-old male with PMHx of hypertension, hyperlipidemia, obesity, and recurrent ED visits for cellulitis who presents for left leg pain, swelling and redness. This began 12 hours ago and is located on the inner portion of his left knee. Patient endorses soreness at the site when at rest and a sharp pain when walking.  Denies fevers, chills, calf pain, numbness or tingling.  Denies recent trauma, bug bite, or cut to the area.  Denies recent surgery or long travel within the last week. No history of blood clot.           Nursing Notes were reviewed.    PAST MEDICAL HISTORY   History reviewed. No pertinent past medical history.      SURGICAL HISTORY       Past Surgical History:   Procedure Laterality Date    OTHER SURGICAL HISTORY  07/09/2019    Cyst excision    OTHER SURGICAL HISTORY  07/09/2019    Carpal tunnel surgery    OTHER SURGICAL HISTORY  07/19/2019    Tonsillectomy with adenoidectomy    OTHER SURGICAL HISTORY  02/13/2020    Hernia repair         CURRENT MEDICATIONS       Previous Medications    AMMONIUM LACTATE (LAC-HYDRIN) 12 % LOTION    Apply topically to skin after showering.    CHOLECALCIFEROL (VITAMIN D-3) 25 MCG (1000 UT) CAPSULE    TAKE AS DIRECTED.    CYANOCOBALAMIN (VITAMIN B-12) 500 MCG TABLET    Take by mouth.    FLUTICASONE (FLONASE) 50 MCG/ACTUATION NASAL SPRAY    Administer 1 spray into each nostril once daily. Shake gently. Before first use, prime pump. After use, clean tip and replace cap.    GUAIFENESIN (MUCINEX) 600 MG 12 HR TABLET    Take by mouth every 12 hours if needed.    LISINOPRIL 5 MG TABLET    Take 1 tablet (5 mg) by mouth once daily.    LORATADINE (CLARITIN) 10  MG TABLET    Take 1 tablet (10 mg) by mouth once daily.    LORATADINE-PSEUDOEPHEDRINE (CLARITIN-D 24 HOUR)  MG 24 HR TABLET    Take 1 tablet by mouth once daily. Do not crush, chew, or split.    METOPROLOL SUCCINATE XL (TOPROL-XL) 25 MG 24 HR TABLET    TAKE 1 TABLET DAILY    ROSUVASTATIN (CRESTOR) 10 MG TABLET    TAKE 1 TABLET AT BEDTIME    SCOPOLAMINE (TRANSDERM-SCOP) 1 MG OVER 3 DAYS PATCH 3 DAY    APPLY BEHIND THE EAR 4 HOURS PRIOR TO TRAVEL AND CHANGE EVERY 3 DAYS    ZINC GLUCONATE 100 MG TABLET    Take 1 tablet (100 mg) by mouth once daily.       ALLERGIES     Penicillins    FAMILY HISTORY       Family History   Problem Relation Name Age of Onset    Breast cancer Mother      Diabetes Father      Diabetes Mother's Sister      Stroke Paternal Grandmother            SOCIAL HISTORY       Social History     Socioeconomic History    Marital status:    Tobacco Use    Smoking status: Never    Smokeless tobacco: Never   Vaping Use    Vaping status: Never Used   Substance and Sexual Activity    Drug use: Never       SCREENINGS                        PHYSICAL EXAM    (up to 7 for level 4, 8 or more for level 5)     ED Triage Vitals [11/09/24 0405]   Temperature Heart Rate Respirations BP   36.2 °C (97.1 °F) 77 18 123/89      Pulse Ox Temp src Heart Rate Source Patient Position   97 % -- Monitor Sitting      BP Location FiO2 (%)     Right arm --       Physical Exam  Vitals and nursing note reviewed.   Constitutional:       General: He is not in acute distress.     Appearance: He is well-developed.   HENT:      Head: Normocephalic and atraumatic.   Eyes:      Extraocular Movements: Extraocular movements intact.      Conjunctiva/sclera: Conjunctivae normal.   Cardiovascular:      Rate and Rhythm: Normal rate and regular rhythm.      Pulses: Normal pulses.      Heart sounds: Normal heart sounds. No murmur heard.  Pulmonary:      Effort: Pulmonary effort is normal. No respiratory distress.      Breath sounds:  Normal breath sounds.   Abdominal:      Palpations: Abdomen is soft.      Tenderness: There is no abdominal tenderness.   Musculoskeletal:         General: Swelling and tenderness (over the medial lower extremity at the level of the knee) present.      Cervical back: Neck supple.   Skin:     General: Skin is warm and dry.      Capillary Refill: Capillary refill takes less than 2 seconds.          Neurological:      Mental Status: He is alert.   Psychiatric:         Mood and Affect: Mood normal.          DIAGNOSTIC RESULTS     LABS:  Labs Reviewed - No data to display    All other labs were within normal range or not returned as of this dictation.    Imaging  No orders to display        Procedures  Procedures     EMERGENCY DEPARTMENT COURSE/MDM:   Medical Decision Making  This is a 59-year-old male with PMHx of hypertension, hyperlipidemia, obesity, and recurrent ED visits for cellulitis who presents for left leg pain, swelling and redness. Vitals were stable in the ED, patient was afebrile.  On physical exam there was a 3 cm x 1 cm area of edema, erythema, warmth, and induration on the medial portion of the left knee.  Differentials include cellulitis, DVT, autoimmune skin condition.  Given history or recurrent cellulitis and physical exam findings, the most likely diagnosis is cellulitis. DVT seems less likely given lack of recent travel, trauma, surgery, or history of blood clots, and patients history of recurrent cellulitis, therefore did not feel a doppler US was warranted. Patient was given keflex 500 mg capsule in the ED. Patient ultimately discharged in stable condition with prescription for keflex 500 mg, to be taken twice daily for 10 days, and referral to dermatology for evaluation of recurrent cellulitis. Return precautions were discussed with the patient and he was agreeable with this plan.    I have seen and evaluated the patient and agree with the medical student's documentation as above.  I have  edited and made adjustments as needed.  Plan of care was discussed with the attending physician.    Please see ED course for the rest of the MDM.    Kaiden Rosario DO, PGY-3  Emergency Medicine        Please see ED course for additional MDM.    Diagnoses as of 11/09/24 0605   Cellulitis of left lower extremity        No orders to display       Patient and or family in agreement and understanding of treatment plan.  All questions answered.      I reviewed the case with the attending ED physician. The attending ED physician agrees with the plan. Patient and/or patient´s representative was counseled regarding labs, imaging, likely diagnosis, and plan. All questions were answered.    ED Medications administered this visit:    Medications   cephalexin (Keflex) capsule 500 mg (500 mg oral Given 11/9/24 0501)       New Prescriptions from this visit:    New Prescriptions    CEPHALEXIN (KEFLEX) 500 MG CAPSULE    Take 1 capsule (500 mg) by mouth 2 times a day for 10 days.       Follow-up:  Willis Nguyen MD  [address] [id number] [phone]              Final Impression:   1. Cellulitis of left lower extremity          (Please note that portions of this note were completed with a voice recognition program.  Efforts were made to edit the dictations but occasionally words are mis-transcribed.)     Jane Meneses  11/09/24 0537       Jane Meneses  11/09/24 0538       Kaiden Rosario DO  Resident  11/09/24 0605

## 2024-11-09 NOTE — DISCHARGE INSTRUCTIONS
We have given you prescription for Keflex to treat any infection related to your symptoms today.  We have also given you a referral to follow-up with dermatology for further evaluation regarding your symptoms.  Please otherwise follow-up with primary care provider for routine ER follow-up.  Be on the looking for signs symptoms of worsening infection including worsening redness, swelling, warmth of the area, fevers, chills, nausea vomiting or any other new or concerning symptoms.

## 2024-12-31 NOTE — PROGRESS NOTES
Subjective   Patient ID: Hay Miller is a 59 y.o. male who presents for Recurrent Cellulitis . I last saw the patient on 1/23/2024  .     HPI  Patient states that he is not currently having an occurrence but he states that he has had cellulitis four times in the last year. Patient states that his more recent occurrence a couple weeks ago was located near his left knee. He was given a topical cream as well as an antibiotic. He states that he was given Keflex then the provider placed him on a different medication. Patient states that he forgot to bring the bottle.  He was seen by [facility] at the end of November     Patient reports swollen left foot that is painful. He states that this started the week before Ana. He has been using acetaminophen with relief but he is still swollen. Denies any injury.    Past medical, surgical, and family history reviewed.  Reviewed and documented all medications   Pt eating well, exercising as tolerated and taking medications as directed.      The patient denies any changes in vision, hearing or dental.     The patient maintains they do not have any chest pain, chest tightness or shortness of breath.    They do not experience nausea, emesis, changes in bowel movements or dyspepsia.    The patient's vaccinations are up to date.      Review of Systems  Except positives as noted in the CC & HPI      Constitutional: Denies fevers, chills, night sweats, fatigue, weight changes, change in appetite    Eyes: Denies blurry vision, double vision    ENT: Denies otalgia, trouble hearing, tinnitus, vertigo, nasal congestion, rhinorrhea, sore throat    Neck: Denies swelling, masses    Cardiovascular: Denies chest pain, palpitations, edema, orthopnea, syncope    Respiratory: Denies dyspnea, cough, wheezing, postural nocturnal dyspnea    Gastrointestinal: Denies abdominal pain, nausea, vomiting, diarrhea, constipation, melena, hematochezia    Genitourinary: Denies dysuria,  hematuria  Musculoskeletal: Denies back pain, neck pain, arthralgias, myalgias    Integumentary: Denies skin lesions, rashes, masses    Neurological: Denies dizziness, headaches, confusion, limb weakness, paresthesias, syncope, convulsions    Psychiatric: Denies depression, anxiety, homicidal ideations, suicidal ideations, sleep disturbances    Endocrine: Denies polyphagia, polydipsia, polyuria, weakness, hair thinning, heat intolerance, cold intolerance, weight changes    Heme/Lymph: Denies easy bruising, easy bleeding, swollen glands    Objective     Physical Exam  Gen. Appearance - well-developed, well-nourished in no acute distress.     Skin - warm and dry without rash or concerning lesions. Skin is dry and scaly.     Mental Status - alert and oriented times 3. Normal mood and affect appropriate to mood.     Neck - supple without lymphadenopathy. Carotid pulses are normal without bruits. Thyroid is normal in midline without nodules.    Chest - lungs are clear to auscultation without rales, rhonchi or wheezes.     Heart - regular, rate, and rhythm without murmurs, rubs or gallops.     Abdomen - soft, obese, protuberant, nondistended. No masses, hepatomegaly or splenomegaly is noted. No rebound, rigidity or guarding is noted. Bowel sounds are normoactive.     Extremities - no cyanosis, clubbing. Trace edema of right lower extremity and 1+ edema on left lower extremity. Pedal pulses are 2+ normal at the dorsalis pedis and posterior tibial pulses bilaterally.     left Foot - Normal alignment and arch. Skin, nails, color, turgor are normal. Pain to palpation to first MTP joint an distal 1st metatarsal. No pain with compression of metatarsal heads. Twan's sign is negative. Sensation intact. Muscle strength +5/5. FROM of all joints. Gait normal.    Neurological - cranial nerves II through XII are grossly intact. Motor strength 5/5 at all fours.     Assessment   1. Encounter for follow-up        2. Recurrent cellulitis         3. Bilateral leg edema  Vascular US lower extremity venous duplex bilateral      4. Left foot pain  XR foot left 3+ views    Uric Acid      5. Sleep apnea, obstructive        6. Chronic fatigue  Home sleep apnea test (HSAT)      7. Benign essential hypertension  CBC and Auto Differential    Comprehensive Metabolic Panel    Urinalysis with Reflex Microscopic      8. Hypercholesterolemia  Lipid Panel      9. Screening PSA (prostate specific antigen)  Prostate Specific Antigen, Screen      10. Class 3 severe obesity due to excess calories with serious comorbidity and body mass index (BMI) of 40.0 to 44.9 in adult            For weight management I recommend exercising and remaining physically active. Keep walking,  exercising being active 30 minutes a day is ideal. Try to maintain a heathy diet that includes green leafy vegetables, fruits and proteins. Try to limit alcohol consumption due to excess of sugars and calories which could hinder weight loss. Also try to limit nighttime snacking. Continue eating a heart healthy diet a good goal 5-7 servings of fresh fruit and vegetable every day along with lean protein avoid simple sugars avoid fast foods. You are encouraged to stay well hydrated.     Patient was advised to limit their salt intake and to not add any extra salt to their food. Patient is to avoid pretzels, chips, lunch meats, canned soups, soda pop, ham, perez, hot dogs, etc.    Patient will continue on a diabetic, low-cholesterol diet and weight reduction. Exercise as tolerated.     I am ordering a sleep study to be done. Please have it done at your earliest convenience. , I have ordered an ultrasound vascular bilateral lower extremities. Please have it done at your earliest convenience. , and I have ordered an x-ray of the left foot. Please have it done at your earliest convenience.     Will obtain uric acid, CBC with Differential, Comprehensive Metabolic, Lipid, Urinalysis, Urine Culture, and PSA  prior to patient's next appointment. Will call patient with results when available.    Patient to continue current medications (with any exceptions as noted) and diet. Follow-up on 01/20 otherwise as needed.     Scribe Attestation  By signing my name below, IMaryanne, Scribjosué   attest that this documentation has been prepared under the direction and in the presence of Willis Nguyen MD.    This note has been transcribed using a medical scribe and there is a possibility of unintentional typing misprints.

## 2025-01-02 ENCOUNTER — LAB (OUTPATIENT)
Dept: LAB | Facility: LAB | Age: 60
End: 2025-01-02
Payer: COMMERCIAL

## 2025-01-02 ENCOUNTER — APPOINTMENT (OUTPATIENT)
Dept: PRIMARY CARE | Facility: CLINIC | Age: 60
End: 2025-01-02
Payer: COMMERCIAL

## 2025-01-02 VITALS
HEIGHT: 70 IN | SYSTOLIC BLOOD PRESSURE: 120 MMHG | OXYGEN SATURATION: 99 % | HEART RATE: 76 BPM | BODY MASS INDEX: 43.69 KG/M2 | DIASTOLIC BLOOD PRESSURE: 84 MMHG | TEMPERATURE: 97.6 F | WEIGHT: 305.2 LBS | RESPIRATION RATE: 16 BRPM

## 2025-01-02 DIAGNOSIS — E78.00 HYPERCHOLESTEROLEMIA: ICD-10-CM

## 2025-01-02 DIAGNOSIS — Z09 ENCOUNTER FOR FOLLOW-UP: Primary | ICD-10-CM

## 2025-01-02 DIAGNOSIS — E66.01 CLASS 3 SEVERE OBESITY DUE TO EXCESS CALORIES WITH SERIOUS COMORBIDITY AND BODY MASS INDEX (BMI) OF 40.0 TO 44.9 IN ADULT: ICD-10-CM

## 2025-01-02 DIAGNOSIS — R60.0 BILATERAL LEG EDEMA: ICD-10-CM

## 2025-01-02 DIAGNOSIS — R53.82 CHRONIC FATIGUE: ICD-10-CM

## 2025-01-02 DIAGNOSIS — Z12.5 SCREENING PSA (PROSTATE SPECIFIC ANTIGEN): ICD-10-CM

## 2025-01-02 DIAGNOSIS — M79.672 LEFT FOOT PAIN: ICD-10-CM

## 2025-01-02 DIAGNOSIS — G47.33 SLEEP APNEA, OBSTRUCTIVE: ICD-10-CM

## 2025-01-02 DIAGNOSIS — I10 BENIGN ESSENTIAL HYPERTENSION: ICD-10-CM

## 2025-01-02 DIAGNOSIS — E66.813 CLASS 3 SEVERE OBESITY DUE TO EXCESS CALORIES WITH SERIOUS COMORBIDITY AND BODY MASS INDEX (BMI) OF 40.0 TO 44.9 IN ADULT: ICD-10-CM

## 2025-01-02 DIAGNOSIS — L03.90 RECURRENT CELLULITIS: ICD-10-CM

## 2025-01-02 LAB
ALBUMIN SERPL BCP-MCNC: 4.5 G/DL (ref 3.4–5)
ALP SERPL-CCNC: 58 U/L (ref 33–120)
ALT SERPL W P-5'-P-CCNC: 30 U/L (ref 10–52)
ANION GAP SERPL CALC-SCNC: 14 MMOL/L (ref 10–20)
APPEARANCE UR: CLEAR
AST SERPL W P-5'-P-CCNC: 24 U/L (ref 9–39)
BASOPHILS # BLD AUTO: 0.05 X10*3/UL (ref 0–0.1)
BASOPHILS NFR BLD AUTO: 0.7 %
BILIRUB SERPL-MCNC: 0.6 MG/DL (ref 0–1.2)
BILIRUB UR STRIP.AUTO-MCNC: NEGATIVE MG/DL
BUN SERPL-MCNC: 16 MG/DL (ref 6–23)
CALCIUM SERPL-MCNC: 10.1 MG/DL (ref 8.6–10.3)
CHLORIDE SERPL-SCNC: 102 MMOL/L (ref 98–107)
CHOLEST SERPL-MCNC: 214 MG/DL (ref 0–199)
CHOLESTEROL/HDL RATIO: 4.2
CO2 SERPL-SCNC: 27 MMOL/L (ref 21–32)
COLOR UR: NORMAL
CREAT SERPL-MCNC: 1.21 MG/DL (ref 0.5–1.3)
EGFRCR SERPLBLD CKD-EPI 2021: 70 ML/MIN/1.73M*2
EOSINOPHIL # BLD AUTO: 0.06 X10*3/UL (ref 0–0.7)
EOSINOPHIL NFR BLD AUTO: 0.8 %
ERYTHROCYTE [DISTWIDTH] IN BLOOD BY AUTOMATED COUNT: 12.7 % (ref 11.5–14.5)
GLUCOSE SERPL-MCNC: 101 MG/DL (ref 74–99)
GLUCOSE UR STRIP.AUTO-MCNC: NORMAL MG/DL
HCT VFR BLD AUTO: 47.8 % (ref 41–52)
HDLC SERPL-MCNC: 51.1 MG/DL
HGB BLD-MCNC: 16.4 G/DL (ref 13.5–17.5)
IMM GRANULOCYTES # BLD AUTO: 0.03 X10*3/UL (ref 0–0.7)
IMM GRANULOCYTES NFR BLD AUTO: 0.4 % (ref 0–0.9)
KETONES UR STRIP.AUTO-MCNC: NEGATIVE MG/DL
LDLC SERPL CALC-MCNC: 103 MG/DL
LEUKOCYTE ESTERASE UR QL STRIP.AUTO: NEGATIVE
LYMPHOCYTES # BLD AUTO: 2.83 X10*3/UL (ref 1.2–4.8)
LYMPHOCYTES NFR BLD AUTO: 37.7 %
MCH RBC QN AUTO: 30 PG (ref 26–34)
MCHC RBC AUTO-ENTMCNC: 34.3 G/DL (ref 32–36)
MCV RBC AUTO: 87 FL (ref 80–100)
MONOCYTES # BLD AUTO: 0.61 X10*3/UL (ref 0.1–1)
MONOCYTES NFR BLD AUTO: 8.1 %
NEUTROPHILS # BLD AUTO: 3.93 X10*3/UL (ref 1.2–7.7)
NEUTROPHILS NFR BLD AUTO: 52.3 %
NITRITE UR QL STRIP.AUTO: NEGATIVE
NON HDL CHOLESTEROL: 163 MG/DL (ref 0–149)
NRBC BLD-RTO: 0 /100 WBCS (ref 0–0)
PH UR STRIP.AUTO: 5.5 [PH]
PLATELET # BLD AUTO: 283 X10*3/UL (ref 150–450)
POTASSIUM SERPL-SCNC: 4.5 MMOL/L (ref 3.5–5.3)
PROT SERPL-MCNC: 7.4 G/DL (ref 6.4–8.2)
PROT UR STRIP.AUTO-MCNC: NEGATIVE MG/DL
PSA SERPL-MCNC: 0.68 NG/ML
RBC # BLD AUTO: 5.47 X10*6/UL (ref 4.5–5.9)
RBC # UR STRIP.AUTO: NEGATIVE /UL
SODIUM SERPL-SCNC: 138 MMOL/L (ref 136–145)
SP GR UR STRIP.AUTO: 1.02
TRIGL SERPL-MCNC: 302 MG/DL (ref 0–149)
URATE SERPL-MCNC: 7.3 MG/DL (ref 4–7.5)
UROBILINOGEN UR STRIP.AUTO-MCNC: NORMAL MG/DL
VLDL: 60 MG/DL (ref 0–40)
WBC # BLD AUTO: 7.5 X10*3/UL (ref 4.4–11.3)

## 2025-01-02 PROCEDURE — 1036F TOBACCO NON-USER: CPT | Performed by: FAMILY MEDICINE

## 2025-01-02 PROCEDURE — 80053 COMPREHEN METABOLIC PANEL: CPT

## 2025-01-02 PROCEDURE — 3079F DIAST BP 80-89 MM HG: CPT | Performed by: FAMILY MEDICINE

## 2025-01-02 PROCEDURE — 3074F SYST BP LT 130 MM HG: CPT | Performed by: FAMILY MEDICINE

## 2025-01-02 PROCEDURE — G0103 PSA SCREENING: HCPCS

## 2025-01-02 PROCEDURE — 81003 URINALYSIS AUTO W/O SCOPE: CPT

## 2025-01-02 PROCEDURE — 84550 ASSAY OF BLOOD/URIC ACID: CPT

## 2025-01-02 PROCEDURE — 85025 COMPLETE CBC W/AUTO DIFF WBC: CPT

## 2025-01-02 PROCEDURE — 99214 OFFICE O/P EST MOD 30 MIN: CPT | Performed by: FAMILY MEDICINE

## 2025-01-02 PROCEDURE — 80061 LIPID PANEL: CPT

## 2025-01-02 PROCEDURE — 3008F BODY MASS INDEX DOCD: CPT | Performed by: FAMILY MEDICINE

## 2025-01-02 ASSESSMENT — ENCOUNTER SYMPTOMS
OCCASIONAL FEELINGS OF UNSTEADINESS: 0
LOSS OF SENSATION IN FEET: 0
DEPRESSION: 0

## 2025-01-02 ASSESSMENT — ANXIETY QUESTIONNAIRES
2. NOT BEING ABLE TO STOP OR CONTROL WORRYING: NOT AT ALL
3. WORRYING TOO MUCH ABOUT DIFFERENT THINGS: NOT AT ALL
5. BEING SO RESTLESS THAT IT IS HARD TO SIT STILL: NOT AT ALL
GAD7 TOTAL SCORE: 0
7. FEELING AFRAID AS IF SOMETHING AWFUL MIGHT HAPPEN: NOT AT ALL
6. BECOMING EASILY ANNOYED OR IRRITABLE: NOT AT ALL
1. FEELING NERVOUS, ANXIOUS, OR ON EDGE: NOT AT ALL
4. TROUBLE RELAXING: NOT AT ALL
IF YOU CHECKED OFF ANY PROBLEMS ON THIS QUESTIONNAIRE, HOW DIFFICULT HAVE THESE PROBLEMS MADE IT FOR YOU TO DO YOUR WORK, TAKE CARE OF THINGS AT HOME, OR GET ALONG WITH OTHER PEOPLE: NOT DIFFICULT AT ALL

## 2025-01-03 ENCOUNTER — HOSPITAL ENCOUNTER (OUTPATIENT)
Dept: RADIOLOGY | Facility: HOSPITAL | Age: 60
Discharge: HOME | End: 2025-01-03
Payer: COMMERCIAL

## 2025-01-03 DIAGNOSIS — R60.0 BILATERAL LEG EDEMA: ICD-10-CM

## 2025-01-03 DIAGNOSIS — M79.672 LEFT FOOT PAIN: ICD-10-CM

## 2025-01-03 PROCEDURE — 93970 EXTREMITY STUDY: CPT

## 2025-01-03 PROCEDURE — 73630 X-RAY EXAM OF FOOT: CPT | Mod: LT

## 2025-01-03 NOTE — RESULT ENCOUNTER NOTE
Patient aware of result via [facility] Message has been sent to patient as well.

## 2025-01-03 NOTE — RESULT ENCOUNTER NOTE
Please call the patient regarding his abnormal result.  Blood sugar was slightly elevated at 101.  Renal function is normal.  T.Chol 214, , HDL 51, . Follow low cholesterol, low fat diet and exercise as tolerated.  PSA is normal at 0.68.  Uric acid level is within normal range at 7.3.  Urinalysis is normal.  CBC is normal.

## 2025-01-16 ENCOUNTER — TELEPHONE (OUTPATIENT)
Dept: PRIMARY CARE | Facility: CLINIC | Age: 60
End: 2025-01-16
Payer: COMMERCIAL

## 2025-01-16 NOTE — TELEPHONE ENCOUNTER
Hay called in to let us know that he has not heard anything regarding scheduling for the sleep study. Please advise.

## 2025-01-20 ENCOUNTER — APPOINTMENT (OUTPATIENT)
Dept: PRIMARY CARE | Facility: CLINIC | Age: 60
End: 2025-01-20
Payer: COMMERCIAL

## 2025-01-22 NOTE — TELEPHONE ENCOUNTER
I unfortunately don't handle sleep studies. Once they are ordered, they fall into a workque and the sleep lab takes care of everything and documents updates on there and is supposed to send to the physicians baskets. However, I was able to find these comments on the referral. Please advise.      Sent on January 21, 2025           Dear Willis Nguyen MD,     Thank you for referring your patient Hay Miller to us. We have reviewed your request and cannot schedule your patient for testing at this time.   Please see the additional comments below that provides the decision reason and/or may have suggestions for revisions to the order.         Reason: Not Clinically Appropriate [1]     Additional triage comments:     Fawn Delgado, MADELIN-CNP 1/21/2025  6:52 AM EST  See information requests     REFERRAL INFORMATION       Referral #:     [id number]     Referred-By  Provider:   Referred-To Specialty    Willis Nguyen MD    Sleep Lab [[id number]]       [address] [address]  [phone]       Diagnosis and Procedure Information:                                 Diagnoses:    R53.82 (ICD-10-CM) - Chronic fatigue                Procedures:     06100 (CPT®) - IN SLEEP STD AIRFLOW HRT RATE&O2 SAT EFFORT UNATT         Please do not hesitate to contact us if you have any questions about this decision.      Referral Triage Info Request    1/14/2025  8:18 AM EST by Fawn Delgado  Dear Willis Nguyen MD     Thank you for referring this patient for a sleep study. We review all sleep study orders as part of our  process.  After reviewing the patient chart, we noted that lack of documentation of signs or symptoms and rationale for ordering a sleep study. This can result in a high risk of audit or denial of payment.      For the benefit of your patient and the Sleep Testing Center, can you please document in your most recent progress note or a documentation note the signs and  symptoms for which you are ordering the sleep study (e.g. snoring, nocturnal awakening, daytime sleepiness, uncontrolled hypertension, etc) and the reason for ordering the sleep study (e.g. rule out sleep apnea)?       Alternatively you can place a Referral to Adult Sleep Medicine for a sleep medicine consultation to allow for more detailed documentation.       RegardsFawn APRN-CNP  Referral Triage Info Request    1/7/2025  9:38 AM EST by Fawn Delgado  Dear Willis Nguyen MD     Thank you for referring this patient for a sleep study. We review all sleep study orders as part of our  process.  After reviewing the patient chart, we noted that lack of documentation of signs or symptoms and rationale for ordering a sleep study. This can result in a high risk of audit or denial of payment.      For the benefit of your patient and the Sleep Testing Center, can you please document in your most recent progress note or a documentation note the signs and symptoms for which you are ordering the sleep study (e.g. snoring, nocturnal awakening, daytime sleepiness, uncontrolled hypertension, etc) and the reason for ordering the sleep study (e.g. rule out sleep apnea)?       Alternatively you can place a Referral to Adult Sleep Medicine for a sleep medicine consultation to allow for more detailed documentation.       Regards,    PIOTR Hernandez

## 2025-01-27 ENCOUNTER — APPOINTMENT (OUTPATIENT)
Dept: PRIMARY CARE | Facility: CLINIC | Age: 60
End: 2025-01-27
Payer: COMMERCIAL

## 2025-01-30 ENCOUNTER — OFFICE VISIT (OUTPATIENT)
Dept: PRIMARY CARE | Facility: CLINIC | Age: 60
End: 2025-01-30
Payer: COMMERCIAL

## 2025-01-30 VITALS
BODY MASS INDEX: 44.01 KG/M2 | WEIGHT: 307.4 LBS | DIASTOLIC BLOOD PRESSURE: 72 MMHG | HEART RATE: 76 BPM | OXYGEN SATURATION: 95 % | RESPIRATION RATE: 16 BRPM | TEMPERATURE: 97.9 F | HEIGHT: 70 IN | SYSTOLIC BLOOD PRESSURE: 124 MMHG

## 2025-01-30 DIAGNOSIS — M79.89 REDNESS AND SWELLING OF THIGH: ICD-10-CM

## 2025-01-30 DIAGNOSIS — E66.813 CLASS 3 SEVERE OBESITY DUE TO EXCESS CALORIES WITH SERIOUS COMORBIDITY AND BODY MASS INDEX (BMI) OF 40.0 TO 44.9 IN ADULT: ICD-10-CM

## 2025-01-30 DIAGNOSIS — E66.01 CLASS 3 SEVERE OBESITY DUE TO EXCESS CALORIES WITH SERIOUS COMORBIDITY AND BODY MASS INDEX (BMI) OF 40.0 TO 44.9 IN ADULT: ICD-10-CM

## 2025-01-30 DIAGNOSIS — L03.115 CELLULITIS OF RIGHT THIGH: Primary | ICD-10-CM

## 2025-01-30 DIAGNOSIS — R52 LOCALIZED PAIN: ICD-10-CM

## 2025-01-30 DIAGNOSIS — R23.8 REDNESS AND SWELLING OF THIGH: ICD-10-CM

## 2025-01-30 PROCEDURE — 99213 OFFICE O/P EST LOW 20 MIN: CPT | Performed by: NURSE PRACTITIONER

## 2025-01-30 RX ORDER — DOXYCYCLINE 100 MG/1
100 CAPSULE ORAL 2 TIMES DAILY
Qty: 20 CAPSULE | Refills: 0 | Status: SHIPPED | OUTPATIENT
Start: 2025-01-30 | End: 2025-02-09

## 2025-01-30 ASSESSMENT — ENCOUNTER SYMPTOMS
OCCASIONAL FEELINGS OF UNSTEADINESS: 0
LOSS OF SENSATION IN FEET: 0
DEPRESSION: 0

## 2025-01-30 ASSESSMENT — PATIENT HEALTH QUESTIONNAIRE - PHQ9
2. FEELING DOWN, DEPRESSED OR HOPELESS: NOT AT ALL
1. LITTLE INTEREST OR PLEASURE IN DOING THINGS: NOT AT ALL
SUM OF ALL RESPONSES TO PHQ9 QUESTIONS 1 AND 2: 0
1. LITTLE INTEREST OR PLEASURE IN DOING THINGS: NOT AT ALL
SUM OF ALL RESPONSES TO PHQ9 QUESTIONS 1 AND 2: 0
2. FEELING DOWN, DEPRESSED OR HOPELESS: NOT AT ALL

## 2025-01-30 ASSESSMENT — PAIN SCALES - GENERAL: PAINLEVEL_OUTOF10: 3

## 2025-01-30 NOTE — PROGRESS NOTES
"Subjective   Patient ID: Hay Miller is a 60 y.o. male who presents for Leg Pain.      Symptoms: right leg pain pt states it can be cellulitis, pt states it radiates to the groin area no rash but it is sore and warm to the touch  Length of symptoms: 2 days ago  OTC: none  Related information:    HPI     Review of Systems    Objective   /72   Pulse 76   Temp 36.6 °C (97.9 °F)   Resp 16   Ht 1.778 m (5' 10\")   Wt 139 kg (307 lb 6.4 oz)   SpO2 95%   BMI 44.11 kg/m²     Physical Exam    Assessment/Plan          "

## 2025-01-30 NOTE — PROGRESS NOTES
Subjective   Patient ID: Hay Miller is a 60 y.o. male who is with complaints of redness, pain, tenderness, and warmth on a localized area of the right thigh.    HPI   Patient is a 60 y.o. male who CONSULTED AT [facility] today. Patient is with complaints of  redness, pain, tenderness, and warmth on a localized area on the front and medial aspect of the distal right thigh. Patient states symptoms has been going on for 3 days. Patient has not taken any medication for relief of symptoms. He states that he does not recall any injury nor any particular precipitating event. He denies paralysis, paresthesia fever, chills, nor changes in the color of the nail bed nor skin on the tip of toes of involved extremity.    Review of Systems  General: no weight loss, generally healthy, no fatigue  Head:  no headaches / sinus pain, no vertigo, no injury  Eyes: no diplopia, no tearing, no pain,   Ears: no change in hearing, no tinnitus, no bleeding, no vertigo  Mouth:  no dental difficulties, no gingival bleeding, no sore throat, no loss of sense of taste  Nose: no congestion, no  discharge, no bleeding, no obstruction, no loss of sense of smell  Neck: no stiffness, no pain, no tenderness, no masses, no bruit  Pulmonary: no dyspnea, no wheezing, no hemoptysis, no cough  Cardiovascular: no chest pain, no palpitations, no syncope, no orthopnea  Gastrointestinal: no change in appetite, no dysphagia, no abdominal pains, no diarrhea, no emesis, no melena  Genito Urinary: no dysuria, no urinary urgency, no nocturia, no incontinence, no change in nature of urine  Musculoskeletal / extremities: (+) redness, pain, tenderness, and warmth on a localized area of the right thigh, no muscle ache, no joint pain, no limitation of range of motion, no paresthesia, no numbness  Constitutional: no fever, no chills, no night sweats    Objective   /72   Pulse 76   Temp 36.6 °C (97.9 °F)   Resp 16   Ht 1.778 m (5'  "10\")   Wt 139 kg (307 lb 6.4 oz)   SpO2 95%   BMI 44.11 kg/m²     Physical Exam  General: ambulatory, in no acute distress  Head: normocephalic, no lesions  Chest: symmetrical chest expansion, no lagging, no retractions, clear breath sounds, no rales, no wheezes  Musculoskeletal: no limitation of range of motion, no paralysis, no deformity  Extremities: full and equal peripheral pulses, no edema, right thigh is (+) for localized area on the anteromedial aspect that is erythematous, slightly swollen, tender, warm, and is about 12 cm x 8 cm, with no bleeding nor discharge, no lymphatic streaking, capillary refill is < 2 seconds on all toes.    Assessment/Plan   Problem List Items Addressed This Visit             ICD-10-CM    Class 3 severe obesity due to excess calories with serious comorbidity and body mass index (BMI) of 40.0 to 44.9 in adult E66.813, E66.01, Z68.41     Other Visit Diagnoses         Codes    Cellulitis of right thigh    -  Primary L03.115    Relevant Medications    doxycycline (Vibramycin) 100 mg capsule    Redness and swelling of thigh     M79.89, R23.8    Relevant Medications    doxycycline (Vibramycin) 100 mg capsule    Localized pain     R52    Relevant Medications    doxycycline (Vibramycin) 100 mg capsule    BMI 40.0-44.9, adult (Multi)     Z68.41        DISCHARGE SUMMARY:   Patient seen and examined. Diagnosis, differential diagnosis, treatment options, and probable complications discussed and explained to patient. Patient to take medication/s associated with today's consultation. Patient may also take OTC analgesic for pain if needed. Patient advised on daily care of involved area. Patient was advised to come back if there is worsening or persistent symptoms.     Patient advised to follow up in 5- 7 days. Patient verbalized understanding about plan of care.         "

## 2025-01-31 NOTE — PATIENT INSTRUCTIONS
DISCHARGE SUMMARY:   Patient seen and examined. Diagnosis, differential diagnosis, treatment options, and probable complications discussed and explained to patient. Patient to take medication/s associated with today's consultation. Patient may also take OTC analgesic for pain if needed. Patient advised on daily care of involved area. Patient was advised to come back if there is worsening or persistent symptoms.     Patient advised to follow up in 5- 7 days. Patient verbalized understanding about plan of care.

## 2025-02-12 ENCOUNTER — APPOINTMENT (OUTPATIENT)
Dept: PRIMARY CARE | Facility: CLINIC | Age: 60
End: 2025-02-12
Payer: COMMERCIAL

## 2025-02-12 VITALS
SYSTOLIC BLOOD PRESSURE: 98 MMHG | HEIGHT: 70 IN | RESPIRATION RATE: 16 BRPM | WEIGHT: 310 LBS | TEMPERATURE: 97.4 F | DIASTOLIC BLOOD PRESSURE: 70 MMHG | HEART RATE: 79 BPM | BODY MASS INDEX: 44.38 KG/M2 | OXYGEN SATURATION: 96 %

## 2025-02-12 DIAGNOSIS — I10 BENIGN ESSENTIAL HYPERTENSION: ICD-10-CM

## 2025-02-12 DIAGNOSIS — Z13.1 SCREENING FOR DIABETES MELLITUS (DM): ICD-10-CM

## 2025-02-12 DIAGNOSIS — Z11.59 NEED FOR HEPATITIS C SCREENING TEST: ICD-10-CM

## 2025-02-12 DIAGNOSIS — E78.00 HYPERCHOLESTEROLEMIA: ICD-10-CM

## 2025-02-12 DIAGNOSIS — R53.82 CHRONIC FATIGUE: ICD-10-CM

## 2025-02-12 DIAGNOSIS — G47.33 SLEEP APNEA, OBSTRUCTIVE: Primary | ICD-10-CM

## 2025-02-12 PROCEDURE — 3078F DIAST BP <80 MM HG: CPT | Performed by: FAMILY MEDICINE

## 2025-02-12 PROCEDURE — 3074F SYST BP LT 130 MM HG: CPT | Performed by: FAMILY MEDICINE

## 2025-02-12 PROCEDURE — 1036F TOBACCO NON-USER: CPT | Performed by: FAMILY MEDICINE

## 2025-02-12 PROCEDURE — 99213 OFFICE O/P EST LOW 20 MIN: CPT | Performed by: FAMILY MEDICINE

## 2025-02-12 PROCEDURE — 3008F BODY MASS INDEX DOCD: CPT | Performed by: FAMILY MEDICINE

## 2025-02-12 NOTE — PROGRESS NOTES
Subjective   Patient ID: Hay Miller is a 60 y.o. male who presents for Recurrent Cellulitis . I last saw the patient on 1/2/2025    HPI  Past medical, surgical, and family history reviewed.  Reviewed and documented all medications   Pt eating well, exercising as tolerated and taking medications as directed    Has no medical concerns ir refill requests for rooming MA    Patient mentions walking more to reduce his weight.    Patient notes fatigue, snoring, dry mouth, frequent night time awakening, restless sleep and waking up un refreshed. Patient's wife has noted episodes of irregular breathing while sleeping.    Patient has another recurrence of cellulitis of his right leg which was treated early by the provider in the [facility] care.    Patient does notes bilateral shoulder pain with certain movements. He has tried ibuprofen and naproxen with some relief.    Review of Systems  Except positives as noted in the CC & HPI      Constitutional: Denies fevers, chills, night sweats, fatigue, weight changes, change in appetite    Eyes: Denies blurry vision, double vision    ENT: Denies otalgia, trouble hearing, tinnitus, vertigo, nasal congestion, rhinorrhea, sore throat    Neck: Denies swelling, masses    Cardiovascular: Denies chest pain, palpitations, edema, orthopnea, syncope    Respiratory: Denies dyspnea, cough, wheezing, postural nocturnal dyspnea    Gastrointestinal: Denies abdominal pain, nausea, vomiting, diarrhea, constipation, melena, hematochezia    Genitourinary: Denies dysuria, hematuria  Musculoskeletal: Denies back pain, neck pain, arthralgias, myalgias    Integumentary: Denies skin lesions, rashes, masses    Neurological: Denies dizziness, headaches, confusion, limb weakness, paresthesias, syncope, convulsions    Psychiatric: Denies depression, anxiety, homicidal ideations, suicidal ideations, sleep disturbances    Endocrine: Denies polyphagia, polydipsia, polyuria, weakness, hair thinning, heat  "intolerance, cold intolerance, weight changes    Heme/Lymph: Denies easy bruising, easy bleeding, swollen glands    Objective     Vitals:    02/12/25 1525   BP: 98/70   Pulse: 79   Resp: 16   Temp: 36.3 °C (97.4 °F)   SpO2: 96%   Weight: 141 kg (310 lb)   Height: 1.778 m (5' 10\")        Physical Exam  Gen. Appearance - well-developed, well-nourished in no acute distress.     Skin - warm and dry without rash or concerning lesions. Skin is dry and scaly.     Mental Status - alert and oriented times 3. Normal mood and affect appropriate to mood.     Mouth - pharynx is pink without exudates. Tonsils are absent. Dentition is normal appearing. Tongue and uvula move in the midline.     Neck - supple without lymphadenopathy. Carotid pulses are normal without bruits. Thyroid is normal in midline without nodules.    Chest - lungs are clear to auscultation without rales, rhonchi or wheezes.     Heart - regular, rate, and rhythm without murmurs, rubs or gallops.     Abdomen - soft, obese, protuberant, nondistended. No masses, hepatomegaly or splenomegaly is noted. No rebound, rigidity or guarding is noted. Bowel sounds are normoactive.     Extremities - no cyanosis, clubbing. Trace edema bilaterally. Pedal pulses are 2+ normal at the dorsalis pedis and posterior tibial pulses bilaterally.     Neurological - cranial nerves II through XII are grossly intact. Motor strength 5/5 at all fours.     Assessment   1. Sleep apnea, obstructive  Home sleep apnea test (HSAT)      2. Chronic fatigue  Home sleep apnea test (HSAT)      3. Benign essential hypertension        4. Hypercholesterolemia        5. Need for hepatitis C screening test        6. Screening for diabetes mellitus (DM)          For weight management I recommend exercising and remaining physically active. Keep walking,  exercising being active 30 minutes a day is ideal. Try to maintain a heathy diet that includes green leafy vegetables, fruits and proteins. Try to limit " alcohol consumption due to excess of sugars and calories which could hinder weight loss. Also try to limit nighttime snacking. Continue eating a heart healthy diet a good goal 5-7 servings of fresh fruit and vegetable every day along with lean protein avoid simple sugars avoid fast foods. You are encouraged to stay well hydrated.     Patient was advised to limit their salt intake and to not add any extra salt to their food. Patient is to avoid pretzels, chips, lunch meats, canned soups, soda pop, ham, perez, hot dogs, etc.    Patient will continue on a diabetic, low-cholesterol diet and weight reduction. Exercise as tolerated. He will continue medications as prescribed. Follow-up in 3 month(s) otherwise as needed.      Ordered Home sleep apnea test (HSAT) to evaluate PETE (obstructive sleep apnea). Will call patient with results when available.       Scribe Attestation  By signing my name below, IElisabeth , Scribjosué   attest that this documentation has been prepared under the direction and in the presence of Willis Nguyen MD.

## 2025-03-04 DIAGNOSIS — R00.2 HEART PALPITATIONS: ICD-10-CM

## 2025-03-04 RX ORDER — METOPROLOL SUCCINATE 25 MG/1
TABLET, EXTENDED RELEASE ORAL
Qty: 90 TABLET | Refills: 3 | Status: SHIPPED | OUTPATIENT
Start: 2025-03-04

## 2025-03-04 NOTE — TELEPHONE ENCOUNTER
Rx Refill Request     Name: Hay Miller  :  1965   Medication Name:  metoprolol succinate XL (Toprol-XL) 25 mg 24 hr tablet   Specific Pharmacy location:  [facility]   Date of last appointment:  2025   Date of next appointment:  Visit date not found   Best number to reach patient:  [phone]

## 2025-03-10 ENCOUNTER — CLINICAL SUPPORT (OUTPATIENT)
Dept: SLEEP MEDICINE | Facility: HOSPITAL | Age: 60
End: 2025-03-10
Payer: COMMERCIAL

## 2025-03-10 DIAGNOSIS — G47.33 SLEEP APNEA, OBSTRUCTIVE: ICD-10-CM

## 2025-03-10 DIAGNOSIS — R53.82 CHRONIC FATIGUE: ICD-10-CM

## 2025-03-10 PROCEDURE — 95806 SLEEP STUDY UNATT&RESP EFFT: CPT | Performed by: INTERNAL MEDICINE

## 2025-03-10 NOTE — PROGRESS NOTES
Type of Study: HOME SLEEP STUDY - NOMAD     The patient received equipment and instructions for use of the [facility] Nomad HSAT 4007 device. The patient was instructed how to apply the effort belts, cannula, thermistor. It was also explained how the Nomad and oximeter components work.  The patient was asked to record their sleep for an 8-hour period.     The patient was informed of their responsibility for the device and acknowledged this by signing the HSAT device contract. The patient was asked to return the device on 3/11/2025 between the hours of 9am to the Sleep Center.     The patient was instructed to call 911 as usual for any medical- emergencies while at home.  The patient was also given a phone number for troubleshooting when using the device in case there were additional questions.

## 2025-03-17 NOTE — RESULT ENCOUNTER NOTE
Please call the patient regarding his abnormal result.  Sleep study revealed mild obstructive sleep apnea.  Will discuss at patient's follow-up visit on 4-2.

## 2025-03-20 ENCOUNTER — OFFICE VISIT (OUTPATIENT)
Dept: ORTHOPEDIC SURGERY | Facility: CLINIC | Age: 60
End: 2025-03-20
Payer: COMMERCIAL

## 2025-03-20 VITALS — BODY MASS INDEX: 43.67 KG/M2 | WEIGHT: 305 LBS | HEIGHT: 70 IN

## 2025-03-20 DIAGNOSIS — S86.111A GASTROCNEMIUS STRAIN, RIGHT, INITIAL ENCOUNTER: ICD-10-CM

## 2025-03-20 DIAGNOSIS — R25.2 CALF CRAMP: ICD-10-CM

## 2025-03-20 PROCEDURE — 1036F TOBACCO NON-USER: CPT | Performed by: FAMILY MEDICINE

## 2025-03-20 PROCEDURE — 99204 OFFICE O/P NEW MOD 45 MIN: CPT | Performed by: FAMILY MEDICINE

## 2025-03-20 PROCEDURE — 3008F BODY MASS INDEX DOCD: CPT | Performed by: FAMILY MEDICINE

## 2025-03-20 PROCEDURE — 99213 OFFICE O/P EST LOW 20 MIN: CPT | Performed by: FAMILY MEDICINE

## 2025-03-20 RX ORDER — METHYLPREDNISOLONE 4 MG/1
TABLET ORAL
Qty: 1 EACH | Refills: 0 | Status: SHIPPED | OUTPATIENT
Start: 2025-03-20

## 2025-03-20 NOTE — LETTER
March 20, 2025     Patient: Hay Miller   YOB: 1965   Date of Visit: 3/20/2025       To Whom It May Concern:    It is my medical opinion that Hay Miller may return to work on 3/21/2025 . Please excuse him from any missed work.     If you have any questions or concerns, please don't hesitate to call.  Sincerely,  Cole C Budinsky, MD  Electronically Signed

## 2025-03-20 NOTE — PROGRESS NOTES
Acute Injury New Patient Visit    CC:   Chief Complaint   Patient presents with   • Right Lower Leg - Pain     Right calf pain , 2 days ago felt a pop        HPI: Hay is a 60 y.o.male who presents today with new complaints of chronic and persistent pain to the medial portion of his right leg and calf.  2 days ago he felt a worsening pop.  He has had some intermittent cramping for the last 6 weeks he also had a recent ultrasound which was negative for clot or DVT has had some ongoing cellulitis issues to the leg as well.  He denies any history of diabetes.  He presents here today for further evaluation.        Review of Systems   GENERAL: Negative for malaise, significant weight loss, fever  MUSCULOSKELETAL: See HPI  NEURO: Negative for numbness / tingling     Past Medical History  History reviewed. No pertinent past medical history.    Medication review  Medication Documentation Review Audit       Reviewed by Cole C Budinsky, MD (Physician) on 03/20/25 at 1319      Medication Order Taking? Sig Documenting Provider Last Dose Status   ammonium lactate (Lac-Hydrin) 12 % lotion [id number] No Apply topically to skin after showering. Willis Nguyen MD Taking Active   cholecalciferol (Vitamin D-3) 25 MCG (1000 UT) capsule [id number] No TAKE AS DIRECTED.   Patient not taking: Reported on 1/30/2025    Historical Provider, MD Not Taking Active   cyanocobalamin (Vitamin B-12) 500 mcg tablet [id number] No Take by mouth.   Patient not taking: Reported on 1/30/2025    Historical MD Korey Not Taking Active   guaiFENesin (Mucinex) 600 mg 12 hr tablet [id number] No Take by mouth every 12 hours if needed.   Patient not taking: Reported on 1/30/2025    Historical MD Korey Taking Active   lisinopril 5 mg tablet [id number]  Take 1 tablet (5 mg) by mouth once daily. Willis Nguyen MD  Active   loratadine-pseudoephedrine (Claritin-D 24 Hour)  mg 24 hr tablet [id number] No Take 1 tablet by mouth once daily. Do not crush,  chew, or split. Willis Nguyen MD Taking  10/09/24 5926   metoprolol succinate XL (Toprol-XL) 25 mg 24 hr tablet [id number]  TAKE 1 TABLET DAILY Willis Nguyen MD  Active   rosuvastatin (Crestor) 10 mg tablet [id number]  TAKE 1 TABLET AT BEDTIME Willis Nguyen MD  Active   scopolamine (Transderm-Scop) 1 mg over 3 days patch 3 day [id number] No APPLY BEHIND THE EAR 4 HOURS PRIOR TO TRAVEL AND CHANGE EVERY 3 DAYS   Patient not taking: Reported on 2025    Historical Provider, MD Not Taking Active   zinc gluconate 100 mg tablet [id number] No Take 1 tablet (100 mg) by mouth once daily.   Patient not taking: Reported on 2025    Historical Provider, MD Not Taking Active                    Allergies  Allergies   Allergen Reactions   • Penicillins Swelling       Social History  Social History     Socioeconomic History   • Marital status:      Spouse name: Not on file   • Number of children: Not on file   • Years of education: Not on file   • Highest education level: Not on file   Occupational History   • Not on file   Tobacco Use   • Smoking status: Never   • Smokeless tobacco: Never   Vaping Use   • Vaping status: Never Used   Substance and Sexual Activity   • Alcohol use: Not on file   • Drug use: Never   • Sexual activity: Not on file   Other Topics Concern   • Not on file   Social History Narrative   • Not on file     Social Drivers of Health     Financial Resource Strain: Not on file   Food Insecurity: Not on file   Transportation Needs: Not on file   Physical Activity: Not on file   Stress: Not on file   Social Connections: Not on file   Intimate Partner Violence: Not on file   Housing Stability: Not on file       Surgical History  Past Surgical History:   Procedure Laterality Date   • OTHER SURGICAL HISTORY  2019    Cyst excision   • OTHER SURGICAL HISTORY  2019    Carpal tunnel surgery   • OTHER SURGICAL HISTORY  2019    Tonsillectomy with adenoidectomy   • OTHER SURGICAL  HISTORY  02/13/2020    Hernia repair       Physical Exam:  GENERAL:  Patient is awake, alert, and oriented to person place and time.  Patient appears well nourished and well kept.  Affect Calm, Not Acutely Distressed.  HEENT:  Normocephalic, Atraumatic, EOMI  CARDIOVASCULAR:  Hemodynamically stable.  RESPIRATORY:  Normal respirations with unlabored breathing.  NEURO: Gross sensation intact to the lower extremities bilaterally.  Extremity: Right calf demonstrates soft tissue tenderness to palpation over the medial gastroc there is no lateral gastroc pain there is no pain or discomfort at the Achilles.  He has ability to fully plantarflex dorsiflex diana and invert with a little bit of discomfort no obvious palpable Baker's cyst.  Full intact extensor mechanism at the knee.      Diagnostics: No new imaging today        Procedure: None  Procedures    Assessment:   Problem List Items Addressed This Visit    None  Visit Diagnoses       Calf cramp        Relevant Medications    methylPREDNISolone (Medrol Dospak) 4 mg tablets    Other Relevant Orders    Referral to Physical Therapy    Gastrocnemius strain, right, initial encounter        Relevant Medications    methylPREDNISolone (Medrol Dospak) 4 mg tablets    Other Relevant Orders    Referral to Physical Therapy             Plan: At this time discussed conservative broach management to the patient's cramping and gastroc strain.  For now we will offer him an oral steroid pack to reduce swelling and inflammation he may continue with his compression socks recommended icing with significant pain but utilizing heat to loosen up the muscles in addition to topical muscle rubs and creams.  There is concern for weakness of the muscle seen on recent ultrasound so we will get him involved with some physical therapy for his calf.  Will see him back in 4 weeks for repeat evaluation if worse or no better we will obtain further advanced imaging.  For now there is no convincing or  concerning evidence for clot or DVT so we will hold off on any scan, also discussed with the patient that however should the pain worsen or persist given the swelling in the leg he should present urgently to the emergency department for skin check given his ongoing cellulitis and to rule out clot or DVT if necessary at that time.  Did offer the patient a tall boot however he kindly deferred that due to chronic back issues which she does not want to exacerbate so we will offer him an heel lift to place in his shoes for now.  Additionally we could consider a night splint.  Orders Placed This Encounter   • Referral to Physical Therapy   • methylPREDNISolone (Medrol Dospak) 4 mg tablets      At the conclusion of the visit there were no further questions by the patient/family regarding their plan of care.  Patient was instructed to call or return with any issues, questions, or concerns regarding their injury and/or treatment plan described above.     03/20/25 at 1:20 PM - Cole C Budinsky, MD    Office: [phone]    This note was prepared using voice recognition software.  The details of this note are correct and have been reviewed, and corrected to the best of my ability.  Some grammatical errors may persist related to the Dragon software.

## 2025-03-27 ENCOUNTER — EVALUATION (OUTPATIENT)
Dept: PHYSICAL THERAPY | Facility: CLINIC | Age: 60
End: 2025-03-27
Payer: COMMERCIAL

## 2025-03-27 DIAGNOSIS — S86.111A GASTROCNEMIUS STRAIN, RIGHT, INITIAL ENCOUNTER: ICD-10-CM

## 2025-03-27 DIAGNOSIS — R25.2 CALF CRAMP: ICD-10-CM

## 2025-03-27 PROCEDURE — 97110 THERAPEUTIC EXERCISES: CPT | Mod: GP

## 2025-03-27 PROCEDURE — 97161 PT EVAL LOW COMPLEX 20 MIN: CPT | Mod: GP

## 2025-03-27 ASSESSMENT — PAIN SCALES - GENERAL: PAINLEVEL_OUTOF10: 2

## 2025-03-27 ASSESSMENT — PAIN DESCRIPTION - DESCRIPTORS: DESCRIPTORS: ACHING;CRAMPING;SPASM

## 2025-03-27 ASSESSMENT — PAIN - FUNCTIONAL ASSESSMENT: PAIN_FUNCTIONAL_ASSESSMENT: 0-10

## 2025-03-27 NOTE — PROGRESS NOTES
"Physical Therapy    Physical Therapy Evaluation    Patient Name: Hay Miller  MRN: [id number]  Today's Date: 3/27/2025   confirmed verbally by patient.    Time Entry:   Time Calculation  Start Time: 1315  Stop Time: 1400  Time Calculation (min): 45 min  PT Evaluation Time Entry  PT Evaluation (Low) Time Entry: 20  PT Therapeutic Procedures Time Entry  Therapeutic Exercise Time Entry: 25                    Reason for Visit:  Referred by: Cole C Budinsky, MD (3/20/2025)   Referral DX: Calf cramp [R25.2], Gastrocnemius strain, right, initial encounter [S86.111A]     Insurance:  Visit: #1  Authorized: to be determined  Payor/Plan:  MEDICAL [facility] MED    BENEFIT /  NO AUTH / 10% COINS / MED NEC     Current Problem  1. Calf cramp  Referral to Physical Therapy    Follow Up In Physical Therapy      2. Gastrocnemius strain, right, initial encounter  Referral to Physical Therapy    Follow Up In Physical Therapy          Subjective   Date of Onset: 2024  Chief Complaint: right gastroc pain/ injury    Patient is a 60 year old male presenting to PT with reports of right calf pain, has been on/off over the last couple of years.   Reports this injury re occurs when he over does it or walks too much after being more sedentary. Last episode was in February while being in [place]- walked 20,000 steps and felt a \"pop\" in the medial gastroc that created swelling, bruising, and aching the next day. Reports it really makes him immobile for a few days when it happens but they he is slowly able to start walking again with some stiffness/soreness. Recalls recent visit with orthopedic surgeon to discuss injury, was sent to PT for conservative care. They have no done further imaging but DVT have been ruled out.     Hx of cellulitis along calves, well managed.   No other hx of injury to knees or ankle.   Denies any numbness/tingling.   Hx of back pain, seemingly unrelated.     Functional " "limitations; walking, standing, recreational activity  Occupation: IT through school district, lots of walking/lifting required at job    Recent Imaging:  Venous Duplex: 01/03/2025; No thrombosis within the veins noted.   XRAY: 01/05/2025;   1. Mild hallux valgus.  2.  Small Achilles insertional enthesophyte which can be associated  with chronic tendinosis.    Patient stated goals for treatment: \"rehab calf\"    Reviewed medical screening history form with patient: Yes.     Barriers Impacting Care:  None.    Precautions:  Precautions  STEADI Fall Risk Score (The score of 4 or more indicates an increased risk of falling): 0  LE Weight Bearing Status: Weight Bearing as Tolerated  Prosthesis/Orthosis Used:  (1/2 inch heel lift)  Precautions Comment: None.    Pain:  Pain Assessment: 0-10  0-10 (Numeric) Pain Score: 2  Pain Type: Acute pain  Pain Location: Leg  Pain Orientation: Right, Lower  Pain Radiating Towards: Medial Gastroc  Pain Descriptors: Aching, Cramping, Spasm  Pain Frequency: Intermittent  Pain Onset: Sudden  Patient's Stated Pain Goal: No pain    Alleviating: Rest, Sitting  Aggravating: Walking, Stretching     Objective      Range of Motion:   Bilateral knees AROM WFL all pain-free    Right Ankle:   AROM  Dorsiflexion 0-5 +familiar pain   Inversion full pain-free   Eversion full pain-free    Left Ankle: All planes full pain-free     Strength:   PF tested open chain right- 4/5 +familiar pain  Dorsiflexion 5/5  Eversion 5/5  Inversion 4+/5    Left Ankle all 5/5, pain-free     Palpation:   +TTP pin point along medial gastroc muscle belly  No tenderness along achilles or heel insertion     Special Tests:  No + achilles special testing    Gait:   Limited push off with right     Function:  Squat- able 1/2 +familiar pain end range    TREATMENT  Educated patient on course of treatment.     THERAPEUTIC EXERCISE: x 1   Introduced:  See home exercise plan below.     Access Code: [email or web address]  URL: " [email or web address]  Date: 03/27/2025  Prepared by: Nora Hernandez    Home Exercises:  - Seated Heel Slide  - 1 x daily - 7 x weekly - 2 sets - 10 reps  - Seated Ankle Pumps  - 1 x daily - 7 x weekly - 2 sets - 10 reps  - Ankle and Toe Plantarflexion with Resistance  - 1 x daily - 7 x weekly - 3 sets - 10 reps - 5 second holds hold  - Mini Squat with Counter Support  - 1 x daily - 7 x weekly - 2 sets - 10 reps  - Ankle Inversion Eversion Towel Slide  - 1 x daily - 7 x weekly - 2 sets - 10 reps    HEP: Patient verbalizes and demonstrates understanding of home exercises. Patient will contact writer if with questions or if condition worsens.   Additional Patient Education: Reduce walking for next 2-3 weeks, reduce aggressive stretching 2-3 weeks, reviewed progressive tensile tissue loading - how we will properly improve strength.    Charges: [id number], [id number]    Outcome Measures:  Other Measures  Lower Extremity Funtional Score (LEFS): 45/80     Assessment:  PT Diagnosis: 45  The patient presents with pain, weakness, and reduced mobility and function, likely resulting from a partial tear of the medial gastrocnemius. Real-time ultrasound imaging reveals a hypoechoic area along the medial gastrocnemius, consistent with the clinical examination findings, and differentiates it from the non-injured side. Based on this assessment, the patient is expected to benefit significantly from skilled physical therapy that emphasizes progressive tissue loading to enhance strength and facilitate functional recovery.  Skilled physical therapy is essential to restore the patient to their prior level of function and optimize functional outcomes, helping to ensure a successful recovery and long-term improvement in mobility and performance.    Plan:  Treatment/Interventions: Aquatic therapy, Biofeedback, Blood flow restriction therapy, Cryotherapy, Education/ Instruction, Dry needling, Electrical stimulation, Gait training,  Hot pack, Manual therapy, Neuromuscular re-education, Self care/ home management, Taping techniques, Therapeutic activities, Therapeutic exercises  PT Plan: Skilled PT  PT Frequency: 1 time per week  Duration: 12 weeks  Rehab Potential: Good  Plan of Care Agreement: Patient    Next Visit:   Progress as appropriate and tolerated.   Assess response to HEP.   FU 1x/week.     Goals:  Patient will demonstrate ability to perform bilateral heel raise in 8 weeks, pain-free.   Patient demonstrates improved ankle mobility pain-free symmetrical to unaffected leg in 8 weeks.   LEFS score of at least 70/80 in 12 weeks to return to PLOF.   Patient will be independent with HEP.      Sri Hernandez PT, DPT

## 2025-03-31 ENCOUNTER — TREATMENT (OUTPATIENT)
Dept: PHYSICAL THERAPY | Facility: CLINIC | Age: 60
End: 2025-03-31
Payer: COMMERCIAL

## 2025-03-31 DIAGNOSIS — R25.2 CALF CRAMP: ICD-10-CM

## 2025-03-31 DIAGNOSIS — S86.111A GASTROCNEMIUS STRAIN, RIGHT, INITIAL ENCOUNTER: ICD-10-CM

## 2025-03-31 PROCEDURE — 97140 MANUAL THERAPY 1/> REGIONS: CPT | Mod: GP

## 2025-03-31 PROCEDURE — 97110 THERAPEUTIC EXERCISES: CPT | Mod: GP

## 2025-03-31 ASSESSMENT — PAIN - FUNCTIONAL ASSESSMENT: PAIN_FUNCTIONAL_ASSESSMENT: 0-10

## 2025-03-31 ASSESSMENT — PAIN DESCRIPTION - DESCRIPTORS: DESCRIPTORS: ACHING;TIGHTNESS

## 2025-03-31 ASSESSMENT — PAIN SCALES - GENERAL: PAINLEVEL_OUTOF10: 1

## 2025-03-31 NOTE — PROGRESS NOTES
Physical Therapy    Physical Therapy Treatment    Patient Name: Hay Miller  MRN: [id number]  Today's Date: 3/31/2025    Time Entry:   Time Calculation  Start Time: 1530  Stop Time: 1600  Time Calculation (min): 30 min     PT Therapeutic Procedures Time Entry  Manual Therapy Time Entry: 10  Therapeutic Exercise Time Entry: 20                   Reason for Visit:  Referred by: Cole C Budinsky, MD (3/20/2025)   Referral DX: Calf cramp [R25.2], Gastrocnemius strain, right, initial encounter [S86.111A]      Insurance:  Visit: #2  Authorized: to be determined  Payor/Plan:  MEDICAL [employer] [facility] [employer]   2025 BENEFIT /  NO AUTH / 10% COINS / MED NEC    Current Problem  1. Calf cramp  Follow Up In Physical Therapy      2. Gastrocnemius strain, right, initial encounter  Follow Up In Physical Therapy          Subjective    Arrives today, 2nd visit, was called from wait list.   Patient reports he feels better day by day, doing exercises at home compliant with HEP.   Wearing compression socks.   Light duty at work.     Precautions  Precautions  STEADI Fall Risk Score (The score of 4 or more indicates an increased risk of falling): 0  LE Weight Bearing Status: Weight Bearing as Tolerated  Prosthesis/Orthosis Used:  (1/2 inch heel lift)  Precautions Comment: None.  Pain  Pain Assessment  Pain Assessment: 0-10  0-10 (Numeric) Pain Score: 1  Pain Type: Acute pain  Pain Location: Leg  Pain Orientation: Right, Lower  Pain Radiating Towards: Medial Gastroc  Pain Descriptors: Aching, Tightness  Pain Frequency: Intermittent  Patient's Stated Pain Goal: No pain    Objective     MANUAL: x 1   IASTM medial gastroc/soleus complex; 10 minutes.    THERAPEUTIC EXERCISE: x 1   Seated resisted eversion; green band around forefoot; 20 reps  Prone AROM hamstring curl to PF/DF; 2 x 15 reps  Seated AROM toe raise/heel raise; 3 x 10 reps  Standing bilateral heel raise 1/2 range; BUE on table for support; 2 x 10 reps  Step ups  "4\" step right foot only with quad set; 2 x 10 reps  Reviewed home program updates, discussed continued wearing of compression socks, work restrictions for light duty.    Access Code: [email or web address]  URL: [email or web address]  UPDATED TODAY.     HEP: Patient verbalizes and demonstrates understanding of home exercises. Patient will contact writer if with questions or if condition worsens.   Additional Patient Education: Reduce walking for next 2-3 weeks, reduce aggressive stretching 2-3 weeks, reviewed progressive tensile tissue loading - how we will properly improve strength.     Charges: [id number], [id number]     Assessment:  Patient demonstrates improved symptoms today tolerating a gentle progression to closed chain. Patient continues to benefit from skilled care.     Plan:  Next Visit:   Progress as appropriate and tolerated.   Assess response to HEP.   FU 1x/week.      Goals:  Patient will demonstrate ability to perform bilateral heel raise in 8 weeks, pain-free.   Patient demonstrates improved ankle mobility pain-free symmetrical to unaffected leg in 8 weeks.   LEFS score of at least 70/80 in 12 weeks to return to PLOF.   Patient will be independent with HEP.        Sri Hernandez PT, DPT       "

## 2025-04-02 ENCOUNTER — APPOINTMENT (OUTPATIENT)
Dept: PRIMARY CARE | Facility: CLINIC | Age: 60
End: 2025-04-02
Payer: COMMERCIAL

## 2025-04-02 VITALS
DIASTOLIC BLOOD PRESSURE: 76 MMHG | RESPIRATION RATE: 16 BRPM | HEART RATE: 73 BPM | TEMPERATURE: 97.2 F | OXYGEN SATURATION: 97 % | BODY MASS INDEX: 45.1 KG/M2 | HEIGHT: 70 IN | SYSTOLIC BLOOD PRESSURE: 104 MMHG | WEIGHT: 315 LBS

## 2025-04-02 DIAGNOSIS — I10 BENIGN ESSENTIAL HYPERTENSION: ICD-10-CM

## 2025-04-02 DIAGNOSIS — E66.01 CLASS 3 SEVERE OBESITY DUE TO EXCESS CALORIES WITH SERIOUS COMORBIDITY AND BODY MASS INDEX (BMI) OF 45.0 TO 49.9 IN ADULT: ICD-10-CM

## 2025-04-02 DIAGNOSIS — E66.813 CLASS 3 SEVERE OBESITY DUE TO EXCESS CALORIES WITH SERIOUS COMORBIDITY AND BODY MASS INDEX (BMI) OF 45.0 TO 49.9 IN ADULT: ICD-10-CM

## 2025-04-02 DIAGNOSIS — G47.33 SLEEP APNEA, OBSTRUCTIVE: ICD-10-CM

## 2025-04-02 DIAGNOSIS — E78.00 HYPERCHOLESTEROLEMIA: ICD-10-CM

## 2025-04-02 DIAGNOSIS — Z00.00 ROUTINE GENERAL MEDICAL EXAMINATION AT A HEALTH CARE FACILITY: Primary | ICD-10-CM

## 2025-04-02 PROCEDURE — 1036F TOBACCO NON-USER: CPT | Performed by: FAMILY MEDICINE

## 2025-04-02 PROCEDURE — 3078F DIAST BP <80 MM HG: CPT | Performed by: FAMILY MEDICINE

## 2025-04-02 PROCEDURE — 3008F BODY MASS INDEX DOCD: CPT | Performed by: FAMILY MEDICINE

## 2025-04-02 PROCEDURE — 3074F SYST BP LT 130 MM HG: CPT | Performed by: FAMILY MEDICINE

## 2025-04-02 PROCEDURE — 99396 PREV VISIT EST AGE 40-64: CPT | Performed by: FAMILY MEDICINE

## 2025-04-02 ASSESSMENT — ANXIETY QUESTIONNAIRES
GAD7 TOTAL SCORE: 0
6. BECOMING EASILY ANNOYED OR IRRITABLE: NOT AT ALL
2. NOT BEING ABLE TO STOP OR CONTROL WORRYING: NOT AT ALL
7. FEELING AFRAID AS IF SOMETHING AWFUL MIGHT HAPPEN: NOT AT ALL
3. WORRYING TOO MUCH ABOUT DIFFERENT THINGS: NOT AT ALL
5. BEING SO RESTLESS THAT IT IS HARD TO SIT STILL: NOT AT ALL
4. TROUBLE RELAXING: NOT AT ALL
IF YOU CHECKED OFF ANY PROBLEMS ON THIS QUESTIONNAIRE, HOW DIFFICULT HAVE THESE PROBLEMS MADE IT FOR YOU TO DO YOUR WORK, TAKE CARE OF THINGS AT HOME, OR GET ALONG WITH OTHER PEOPLE: NOT DIFFICULT AT ALL
1. FEELING NERVOUS, ANXIOUS, OR ON EDGE: NOT AT ALL

## 2025-04-02 ASSESSMENT — PATIENT HEALTH QUESTIONNAIRE - PHQ9
1. LITTLE INTEREST OR PLEASURE IN DOING THINGS: NOT AT ALL
SUM OF ALL RESPONSES TO PHQ9 QUESTIONS 1 & 2: 0
2. FEELING DOWN, DEPRESSED OR HOPELESS: NOT AT ALL

## 2025-04-02 ASSESSMENT — ENCOUNTER SYMPTOMS
LOSS OF SENSATION IN FEET: 0
OCCASIONAL FEELINGS OF UNSTEADINESS: 0
DEPRESSION: 0

## 2025-04-02 NOTE — PROGRESS NOTES
Subjective   Patient ID: Hay Miller is a 60 y.o. male who presents for Physical . I last saw the patient on 2/12/2025  .     HPI  Patient has HSAT to be discussed today      Patient presents in office today for an Annual physical. Last eye exam was 2024, last dental exam was 2024. No new family h/o of cancer or heart disease. Does need paperwork filled out today.      Patient is seeing Dr. Budinsky for a strain of his calf muscle. He does have some limitations at work.    Past medical, surgical, and family history reviewed.  Reviewed and documented all medications   Pt eating well, exercising as tolerated and taking medications as directed.      Review of Systems  Except positives as noted in the CC & HPI      Constitutional: Denies fevers, chills, night sweats, fatigue, weight changes, change in appetite    Eyes: Denies blurry vision, double vision    ENT: Denies otalgia, trouble hearing, tinnitus, vertigo, nasal congestion, rhinorrhea, sore throat    Neck: Denies swelling, masses    Cardiovascular: Denies chest pain, palpitations, edema, orthopnea, syncope    Respiratory: Denies dyspnea, cough, wheezing, postural nocturnal dyspnea    Gastrointestinal: Denies abdominal pain, nausea, vomiting, diarrhea, constipation, melena, hematochezia    Genitourinary: Denies dysuria, hematuria  Musculoskeletal: Denies back pain, neck pain, arthralgias, myalgias    Integumentary: Denies skin lesions, rashes, masses    Neurological: Denies dizziness, headaches, confusion, limb weakness, paresthesias, syncope, convulsions    Psychiatric: Denies depression, anxiety, homicidal ideations, suicidal ideations, sleep disturbances    Endocrine: Denies polyphagia, polydipsia, polyuria, weakness, hair thinning, heat intolerance, cold intolerance, weight changes    Heme/Lymph: Denies easy bruising, easy bleeding, swollen glands    Objective   Vitals:    04/02/25 1556 04/02/25 1657   BP: 100/76 104/76   BP Location:  Right arm  "  Patient Position:  Sitting   BP Cuff Size:  Large adult   Pulse: 73    Resp: 16    Temp: 36.2 °C (97.2 °F)    SpO2: 97%    Weight: 143 kg (315 lb)    Height: 1.778 m (5' 10\")         Physical Exam  Gen. Appearance - well-developed, well-nourished in no acute distress.      Skin - warm and dry without rash or concerning lesions. Skin is dry and scaly.      Mental Status - alert and oriented times 3. Normal mood and affect appropriate to mood.      Mouth - pharynx is pink without exudates. Tonsils are absent. Dentition is normal appearing. Tongue and uvula move in the midline.      Neck - supple without lymphadenopathy. Carotid pulses are normal without bruits. Thyroid is normal in midline without nodules.     Chest - lungs are clear to auscultation without rales, rhonchi or wheezes.      Heart - regular, rate, and rhythm without murmurs, rubs or gallops.      Abdomen - soft, obese, protuberant, nondistended. No masses, hepatomegaly or splenomegaly is noted. No rebound, rigidity or guarding is noted. Bowel sounds are normoactive.      Extremities - no cyanosis, clubbing or edema. Bilateral knee high compression stockings in place bilaterally. Pedal pulses are 2+ normal at the dorsalis pedis and posterior tibial pulses bilaterally.      Neurological - cranial nerves II through XII are grossly intact. Motor strength 5/5 at all fours.     Assessment   1. Routine general medical examination at a health care facility        2. Sleep apnea, obstructive  Positive Airway Pressure (PAP) Therapy      3. Benign essential hypertension        4. Hypercholesterolemia        5. Class 3 severe obesity due to excess calories with serious comorbidity and body mass index (BMI) of 45.0 to 49.9 in adult            Patient will continue on a diabetic, low-cholesterol diet and weight reduction. Exercise as tolerated. He will continue medications as prescribed. Follow-up in 3 month(s) otherwise as needed.      Ordered Positive Airway " Pressure (PAP) Therapy for his sleep apnea.    An order was placed to [facility].     His physical form was completed.      Scribe Attestation  By signing my name below, I, Linn Araiza   attest that this documentation has been prepared under the direction and in the presence of Willis Nguyen MD.      All medical record entries made by the scribe were at my direction and personally dictated by me. I have reviewed the chart and agree that the record accurately reflects my personal performance of the history, physical exam, discussion, and plan.    Willis Nguyen M.D.

## 2025-04-07 ENCOUNTER — TREATMENT (OUTPATIENT)
Dept: PHYSICAL THERAPY | Facility: CLINIC | Age: 60
End: 2025-04-07
Payer: COMMERCIAL

## 2025-04-07 DIAGNOSIS — R25.2 CALF CRAMP: ICD-10-CM

## 2025-04-07 DIAGNOSIS — S86.111A GASTROCNEMIUS STRAIN, RIGHT, INITIAL ENCOUNTER: ICD-10-CM

## 2025-04-07 PROCEDURE — 97110 THERAPEUTIC EXERCISES: CPT | Mod: GP

## 2025-04-07 ASSESSMENT — PAIN - FUNCTIONAL ASSESSMENT: PAIN_FUNCTIONAL_ASSESSMENT: 0-10

## 2025-04-07 ASSESSMENT — PAIN SCALES - GENERAL: PAINLEVEL_OUTOF10: 1

## 2025-04-07 ASSESSMENT — PAIN DESCRIPTION - DESCRIPTORS: DESCRIPTORS: TIGHTNESS

## 2025-04-07 NOTE — PROGRESS NOTES
Physical Therapy    Physical Therapy Treatment    Patient Name: Hay Miller  MRN: [id number]  Today's Date: 4/7/2025    Time Entry:   Time Calculation  Start Time: 1602  Stop Time: 1637  Time Calculation (min): 35 min     PT Therapeutic Procedures Time Entry  Manual Therapy Time Entry: 10  Therapeutic Exercise Time Entry: 25                   Reason for Visit:  Referred by: Cole C Budinsky, MD (3/20/2025)   Referral DX: Calf cramp [R25.2], Gastrocnemius strain, right, initial encounter [S86.111A]      Insurance:  Visit: #3  Authorized: to be determined  Payor/Plan:  MEDICAL [facility] [email or web address]   2025 BENEFIT /  NO AUTH / 10% COINS / MED NEC    Current Problem  1. Calf cramp  Follow Up In Physical Therapy      2. Gastrocnemius strain, right, initial encounter  Follow Up In Physical Therapy          Subjective    3rd visit today    Right calf is pretty tight today, may have over done it with work.   Took it easy end of day.     Exercises going well, no new concerns/changes.    Precautions  Precautions  STEADI Fall Risk Score (The score of 4 or more indicates an increased risk of falling): 0  LE Weight Bearing Status: Weight Bearing as Tolerated  Prosthesis/Orthosis Used:  (Heel wedge in shoes 1/2 inch)  Precautions Comment: None.  Pain  Pain Assessment  Pain Assessment: 0-10  0-10 (Numeric) Pain Score: 1  Pain Type: Acute pain  Pain Location: Leg  Pain Orientation: Right, Lower  Pain Radiating Towards: Medial Gastroc  Pain Descriptors: Tightness  Pain Frequency: Intermittent  Patient's Stated Pain Goal: No pain    Objective     MANUAL: x 1 (10 minutes)  STM gastroc/soleus complex right leg (medial), dynamic cupping with ankle pumps during sustained cupping; 10 minutes.    THERAPEUTIC EXERCISE: x 1 (25 minutes)  Prone resisted eccentric PF with therapist holding red band; 20 reps  Seated AP ankle rocker board; 20 reps   Standing march alternating leg; 20 reps  Three step to airex foam pad;  right leg, left, right; 1 hand rail assist; 2 x 10 reps  Reviewed home program updates.     Access Code: [email or web address]  URL: [email or web address]  UPDATED TODAY- provided blue band.      HEP: Patient verbalizes and demonstrates understanding of home exercises. Patient will contact writer if with questions or if condition worsens.   Additional Patient Education: Reduce walking for next 2-3 weeks, reduce aggressive stretching 2-3 weeks, reviewed progressive tensile tissue loading - how we will properly improve strength.     Charges: [id number]     Assessment:  Patient demonstrates improved symptoms today tolerating a gentle progression to closed chain. Patient continues to benefit from skilled care.     Plan:  Next Visit:   Progress as appropriate and tolerated.   Assess response to HEP.   FU 1x/week.      Goals:  Patient will demonstrate ability to perform bilateral heel raise in 8 weeks, pain-free.   Patient demonstrates improved ankle mobility pain-free symmetrical to unaffected leg in 8 weeks.   LEFS score of at least 70/80 in 12 weeks to return to PLOF.   Patient will be independent with HEP.        Sri Hernandez PT, DPT

## 2025-04-14 ENCOUNTER — TREATMENT (OUTPATIENT)
Dept: PHYSICAL THERAPY | Facility: CLINIC | Age: 60
End: 2025-04-14
Payer: COMMERCIAL

## 2025-04-14 DIAGNOSIS — S86.111A GASTROCNEMIUS STRAIN, RIGHT, INITIAL ENCOUNTER: ICD-10-CM

## 2025-04-14 DIAGNOSIS — R25.2 CALF CRAMP: ICD-10-CM

## 2025-04-14 PROCEDURE — 97110 THERAPEUTIC EXERCISES: CPT | Mod: GP

## 2025-04-14 ASSESSMENT — PAIN DESCRIPTION - DESCRIPTORS: DESCRIPTORS: TIGHTNESS;SORE

## 2025-04-14 ASSESSMENT — PAIN SCALES - GENERAL: PAINLEVEL_OUTOF10: 2

## 2025-04-14 ASSESSMENT — PAIN - FUNCTIONAL ASSESSMENT: PAIN_FUNCTIONAL_ASSESSMENT: 0-10

## 2025-04-14 NOTE — PROGRESS NOTES
Physical Therapy    Physical Therapy Treatment    Patient Name: Hay Miller  MRN: [id number]  Today's Date: 4/15/2025    Time Entry:   Time Calculation  Start Time: 1143  Stop Time: 1223  Time Calculation (min): 40 min     PT Therapeutic Procedures Time Entry  Manual Therapy Time Entry: 10  Therapeutic Exercise Time Entry: 25                   Reason for Visit:  Referred by: Cole C Budinsky, MD (3/20/2025)   Referral DX: Calf cramp [R25.2], Gastrocnemius strain, right, initial encounter [S86.111A]      Insurance:  Visit: #4  Authorized: to be determined  Payor/Plan:  MEDICAL [employer] [facility] [employer] [facility]   2025 BENEFIT /  NO AUTH / 10% COINS / MED NEC    Current Problem  1. Calf cramp  Follow Up In Physical Therapy      2. Gastrocnemius strain, right, initial encounter  Follow Up In Physical Therapy          Subjective    Right calf hurts a little more this week, noticed it during walks from work building to car.   Home exercises are not hurting (patient reports compliance with HEP).     No swelling, brusing, or inability to weight bear noted by patient.     Precautions  Precautions  STEADI Fall Risk Score (The score of 4 or more indicates an increased risk of falling): 0  LE Weight Bearing Status: Weight Bearing as Tolerated  Prosthesis/Orthosis Used:  (1/2 inch heel wedge)  Precautions Comment: None  Pain  Pain Assessment  Pain Assessment: 0-10  0-10 (Numeric) Pain Score: 2  Pain Type: Acute pain  Pain Location: Leg  Pain Orientation: Right, Lower  Pain Radiating Towards: Gastroc  Pain Descriptors: Tightness, Sore  Pain Frequency: Intermittent  Patient's Stated Pain Goal: No pain    Objective   Walking post session; less antalgic - better toe off through terminal stance.   AROM WNL compared to left side  +TTP medial gastroc       TREATMENT    MANUAL: x 1 (10 minutes)  IASTM gastroc/soleus complex right leg (medial); patient in prone; 10 minutes.    THERAPEUTIC EXERCISE: x 2 (25 minutes)  Seated  recumbent bike; LE only; 5 minutes; warm up.  Prone resisted eccentric PF; therapist pressure; 2 x 10 reps  Seated AP rocker board forward and backward; 20 taps each direction  Seated isometric plantar flexion; 10' hold, 10 reps  Discussed higher heel wedge and/or boot if provided or prescribed by doctor for healing.   Reviewed other questions and HEP.     Access Code: [email or web address]  URL: [email or web address]  UPDATED TODAY- provided blue band.      HEP: Patient verbalizes and demonstrates understanding of home exercises. Patient will contact writer if with questions or if condition worsens.   Additional Patient Education: Reduce walking for next 2-3 weeks, reduce aggressive stretching 2-3 weeks, reviewed progressive tensile tissue loading - how we will properly improve strength.     Charges: [id number], [id number]     Assessment:  Patient continues to demonstrate signs of medial gastroc strain or partial tear, improving week  by week. Patient will likely benefit from higher heel wedge or boot to improve healing as walking is an exacerbating activity. Responds well to PT, reports less pain or stiffness by end of session.      Plan:  Next Visit:   Progress as appropriate and tolerated.   Assess response to HEP.   FU 1x/week.      Goals:  Patient will demonstrate ability to perform bilateral heel raise in 8 weeks, pain-free.   Patient demonstrates improved ankle mobility pain-free symmetrical to unaffected leg in 8 weeks.   LEFS score of at least 70/80 in 12 weeks to return to PLOF.   Patient will be independent with HEP.        Sri Hernandez PT, DPT

## 2025-04-17 ENCOUNTER — OFFICE VISIT (OUTPATIENT)
Dept: ORTHOPEDIC SURGERY | Facility: CLINIC | Age: 60
End: 2025-04-17
Payer: COMMERCIAL

## 2025-04-17 DIAGNOSIS — R25.2 CALF CRAMP: Primary | ICD-10-CM

## 2025-04-17 DIAGNOSIS — S86.111A GASTROCNEMIUS STRAIN, RIGHT, INITIAL ENCOUNTER: ICD-10-CM

## 2025-04-17 PROCEDURE — 99213 OFFICE O/P EST LOW 20 MIN: CPT | Performed by: FAMILY MEDICINE

## 2025-04-17 NOTE — PROGRESS NOTES
Follow-Up Patient Visit    Subjective   Patient ID: Hay Miller is a 60 y.o. male who presents for No chief complaint on file..  History of Present Illness  Hay Miller is a 60-year-old male who presents for follow-up of leg pain.    He continues to experience pain in his leg, specifically in the back lateral side. Initially, the pain was higher up, then moved lower to the right, and is now localized in a specific area. Despite the pain, he can walk better, which he attributes to massage therapy that identified and worked on a knot in the muscle. The injection he received previously only provided relief for about a week.    He has no pain in the Achilles tendon and can perform exercises with minimal pain. He is currently using a lift in his shoes, as recommended by his physical therapist, who suggested increasing the height of the lift or possibly using a boot. He experiences pain during certain exercises, but it subsides after a while, indicating that the exercises may be beneficial.    He recalls that the pain intensified last week, with episodes on Tuesday and Thursday, but has improved since receiving massage therapy. He describes the area as tight to the touch. He has not been using muscle rubs or creams like Biofreeze or Icy Hot but is considering it to help with muscle soreness as he increases his activity level.    An ultrasound was performed previously, which ruled out a blood clot in the area.    Objective   Physical Exam:  GENERAL:  Patient is awake, alert, and oriented to person place and time.  Patient appears well nourished and well kept.  Affect Calm, Not Acutely Distressed.  HEENT:  Normocephalic, Atraumatic, EOMI  CARDIOVASCULAR:  Hemodynamically stable.  RESPIRATORY:  Normal respirations with unlabored breathing.  NEURO: Gross sensation intact to the lower extremities bilaterally.  EXTREMITY: Right calf as below  Physical Exam  MUSCULOSKELETAL: Right calf without significant soft  tissue swelling or fullness. Small nodular area with muscle trigger point tenderness over posterior lateral right calf. No distal Achilles pain. Full plantar flexion, dorsiflexion, inversion, and eversion. No palpable Baker's cyst. Normal gait.  SKIN: No open cuts, wounds, or sores on right leg. No redness or erythema on right leg.      IMAGING:   Results  RADIOLOGY  Ultrasound: No thrombus detected      Procedures       Assessment & Plan  Gastrocnemius strain, right  Improvement in right calf pain with persistent discomfort in the posterior lateral calf, nodular area, and muscle trigger point tenderness. No Achilles tendon pain. Exercises performed with minimal pain and normal gait. Physical therapist suggested increasing heel lift height or using a boot to reduce muscle pressure. No need for further imaging or injections currently. Persistent symptoms may warrant MRI evaluation. Encouraged use of muscle rubs to alleviate soreness and stimulate blood flow. No significant soft tissue swelling, fullness, palpable Baker's cyst, open cuts, wounds, sores, redness, or erythema. Full plantar flexion, dorsiflexion, inversion, and eversion. Normal gait.  - Use the white heel lift provided under the current cushion in both shoes for even support.  - Consider using a boot if recommended by the physical therapist.  - Apply muscle rubs like Biofreeze or Icy Hot at night and in the morning to alleviate soreness and stimulate blood flow.  - Reassess in 4 to 6 weeks to ensure full recovery and consider reducing the use of the heel lift if symptoms improve.  No orders of the defined types were placed in this encounter.     At the conclusion of the visit there were no further questions by the patient/family regarding their plan of care.  Patient was instructed to call or return with any issues, questions, or concerns regarding their injury and/or treatment plan described above.     04/17/25 at 10:00 AM - Cole C Budinsky, MD  Office:   [phone]  This medical note was created with the assistance of artificial intelligence (AI) for documentation purposes. The content has been reviewed and confirmed by the healthcare provider for accuracy and completeness. Patient consented to the use of audio recording and use of AI during their visit.

## 2025-04-24 ENCOUNTER — TREATMENT (OUTPATIENT)
Dept: PHYSICAL THERAPY | Facility: CLINIC | Age: 60
End: 2025-04-24
Payer: COMMERCIAL

## 2025-04-24 DIAGNOSIS — S86.111A GASTROCNEMIUS STRAIN, RIGHT, INITIAL ENCOUNTER: ICD-10-CM

## 2025-04-24 DIAGNOSIS — R25.2 CALF CRAMP: ICD-10-CM

## 2025-04-24 PROCEDURE — 97140 MANUAL THERAPY 1/> REGIONS: CPT | Mod: GP

## 2025-04-24 PROCEDURE — 97535 SELF CARE MNGMENT TRAINING: CPT | Mod: GP

## 2025-04-24 PROCEDURE — 97110 THERAPEUTIC EXERCISES: CPT | Mod: GP

## 2025-04-24 ASSESSMENT — PAIN - FUNCTIONAL ASSESSMENT: PAIN_FUNCTIONAL_ASSESSMENT: 0-10

## 2025-04-24 ASSESSMENT — PAIN DESCRIPTION - DESCRIPTORS: DESCRIPTORS: TIGHTNESS;SORE

## 2025-04-24 ASSESSMENT — PAIN SCALES - GENERAL: PAINLEVEL_OUTOF10: 2

## 2025-04-24 NOTE — PROGRESS NOTES
Physical Therapy    Physical Therapy Treatment    Patient Name: Hay Miller  MRN: [id number]  Today's Date: 4/24/2025    Time Entry:   Time Calculation  Start Time: 1545  Stop Time: 1627  Time Calculation (min): 42 min     PT Therapeutic Procedures Time Entry  Manual Therapy Time Entry: 10  Therapeutic Exercise Time Entry: 17  Self-Care/Home Mgmt Training: 15                   Reason for Visit:  Referred by: Cole C Budinsky, MD (3/20/2025)   Referral DX: Calf cramp [R25.2], Gastrocnemius strain, right, initial encounter [S86.111A]      Insurance:  Visit: #5  Authorized: to be determined  Payor/Plan:  MEDICAL [employer] [facility] [employer] [facility]   2025 BENEFIT /  NO AUTH / 10% COINS / MED NEC    Current Problem  1. Calf cramp  Follow Up In Physical Therapy      2. Gastrocnemius strain, right, initial encounter  Follow Up In Physical Therapy          Subjective    Right calf is about the same as last week.   Met with Dr. Budinsky again, was given another heel insert but has been having difficulty wearing due to too much discomfort trying to walk and fit in shoes.   Unsure if order for boot was put in.   Exercises at home have been fine.       Precautions  Precautions  STEADI Fall Risk Score (The score of 4 or more indicates an increased risk of falling): 0  LE Weight Bearing Status: Weight Bearing as Tolerated  Prosthesis/Orthosis Used:  (1/2 inch heel lift)  Precautions Comment: None  Pain  Pain Assessment  Pain Assessment: 0-10  0-10 (Numeric) Pain Score: 2  Pain Type: Acute pain  Pain Location: Leg  Pain Orientation: Right, Lower  Pain Radiating Towards: Gastroc Medial  Pain Descriptors: Tightness, Sore  Pain Frequency: Intermittent  Patient's Stated Pain Goal: No pain    Objective   Antalgic gait  AROM WNL compared to left side  +TTP medial gastroc       TREATMENT    MANUAL: x 1 (10 minutes)  IASTM gastroc/soleus complex right leg (medial); patient in prone; 10 minutes.    THERAPEUTIC EXERCISE: x 1 (17  minutes)  Seated recumbent bike; LE only; 5 minutes; warm up.  Long sitting PF/Inversion and then PF/Eversion with blue band; 3 x 10 reps each  Supine double leg bridge; 2 x 10 reps  Strap assisted SLR with quad set; 2 x 10 reps    SELF CARE: x 1 (15 minutes)  Discussed benefit of wearing boot for proper tissue healing.   Contact doctor for order of walking boot.   Reviewed importance of continued activity modification for healing.   Answered other questions.    Access Code: [email or web address]  URL: [email or web address]  UPDATED TODAY- provided blue band.      HEP: Patient verbalizes and demonstrates understanding of home exercises. Patient will contact writer if with questions or if condition worsens.   Additional Patient Education: Reduce walking for next 2-3 weeks, reduce aggressive stretching 2-3 weeks, reviewed progressive tensile tissue loading - how we will properly improve strength.     Charges: 75505, [id number], [id number]     Assessment:  Patient continues to demonstrate signs of medial gastroc partial tear early in healing phase, or acute recovery. Patient requires use of walking boot to limit plantar flexion and protect gastroc/soleus this stage of recovery. Continues to respond well to manual and there ex with reports of decreased pain with walking after session. Benefits from skilled care, required to return to PLOF.     Plan:  Walking boot - continued activity modification.  Cx next week follow up in 2 weeks.   Assess response to HEP.    Goals:  Patient will demonstrate ability to perform bilateral heel raise in 8 weeks, pain-free.   Patient demonstrates improved ankle mobility pain-free symmetrical to unaffected leg in 8 weeks.   LEFS score of at least 70/80 in 12 weeks to return to PLOF.   Patient will be independent with HEP.        Sri Hernandez PT, DPT

## 2025-04-28 ENCOUNTER — DOCUMENTATION (OUTPATIENT)
Dept: ORTHOPEDIC SURGERY | Facility: CLINIC | Age: 60
End: 2025-04-28
Payer: COMMERCIAL

## 2025-04-28 ENCOUNTER — PATIENT MESSAGE (OUTPATIENT)
Dept: PRIMARY CARE | Facility: CLINIC | Age: 60
End: 2025-04-28
Payer: COMMERCIAL

## 2025-04-28 NOTE — LETTER
April 28, 2025     Patient: Hay Miller   YOB: 1965   Date of Visit: 4/28/2025       To Whom It May Concern:    Hay Miller has been under the care of Dr. Cole Budinsky. He will be in a walking boot and have the following restrictions for an estimated 6 weeks: limited walking/standing, limited lifting/pushing/pulling.    If you have any questions or concerns, please don't hesitate to call.         Sincerely,         Dr. Cole Budinsky        CC: No Recipients

## 2025-05-01 ENCOUNTER — APPOINTMENT (OUTPATIENT)
Dept: PHYSICAL THERAPY | Facility: CLINIC | Age: 60
End: 2025-05-01
Payer: COMMERCIAL

## 2025-05-01 ENCOUNTER — APPOINTMENT (OUTPATIENT)
Dept: ORTHOPEDIC SURGERY | Facility: CLINIC | Age: 60
End: 2025-05-01
Payer: COMMERCIAL

## 2025-05-01 ENCOUNTER — TELEPHONE (OUTPATIENT)
Dept: ORTHOPEDIC SURGERY | Facility: CLINIC | Age: 60
End: 2025-05-01
Payer: COMMERCIAL

## 2025-05-01 PROCEDURE — L4361 PNEUMA/VAC WALK BOOT PRE OTS: HCPCS | Performed by: FAMILY MEDICINE

## 2025-05-01 NOTE — TELEPHONE ENCOUNTER
05/01/25  Pt LVM re walking boot that was discussed with his physical therapist and confirmed by Dr. Budinsky.  Scheduled for fitting later today [place]

## 2025-05-08 ENCOUNTER — TREATMENT (OUTPATIENT)
Dept: PHYSICAL THERAPY | Facility: CLINIC | Age: 60
End: 2025-05-08
Payer: COMMERCIAL

## 2025-05-08 DIAGNOSIS — R25.2 CALF CRAMP: ICD-10-CM

## 2025-05-08 DIAGNOSIS — S86.111A GASTROCNEMIUS STRAIN, RIGHT, INITIAL ENCOUNTER: ICD-10-CM

## 2025-05-08 PROCEDURE — 97140 MANUAL THERAPY 1/> REGIONS: CPT | Mod: GP

## 2025-05-08 PROCEDURE — 97110 THERAPEUTIC EXERCISES: CPT | Mod: GP

## 2025-05-08 ASSESSMENT — PAIN SCALES - GENERAL: PAINLEVEL_OUTOF10: 1

## 2025-05-08 ASSESSMENT — PAIN - FUNCTIONAL ASSESSMENT: PAIN_FUNCTIONAL_ASSESSMENT: 0-10

## 2025-05-08 ASSESSMENT — PAIN DESCRIPTION - DESCRIPTORS: DESCRIPTORS: TIGHTNESS;SORE

## 2025-05-08 NOTE — PROGRESS NOTES
"Physical Therapy    Physical Therapy Treatment    Patient Name: Hay Miller  MRN: [id number]  Today's Date: 5/8/2025    Time Entry:   Time Calculation  Start Time: 1600  Stop Time: 1638  Time Calculation (min): 38 min     PT Therapeutic Procedures Time Entry  Manual Therapy Time Entry: 10  Therapeutic Exercise Time Entry: 28                   Reason for Visit:  Referred by: Cole C Budinsky, MD (3/20/2025)   Referral DX: Calf cramp [R25.2], Gastrocnemius strain, right, initial encounter [S86.111A]      Insurance:  Visit: #6  Authorized: to be determined  Payor/Plan:  MEDICAL [employer] [facility] [employer]   2025 BENEFIT /  NO AUTH / 10% COINS / MED NEC    Current Problem  1. Calf cramp  Follow Up In Physical Therapy      2. Gastrocnemius strain, right, initial encounter  Follow Up In Physical Therapy          Subjective    Patient has boot for home, wears it to work and when out of the house.   Reports improvement, is able to walk 2-3 minutes    Precautions  Precautions  STEADI Fall Risk Score (The score of 4 or more indicates an increased risk of falling): 0  LE Weight Bearing Status: Weight Bearing as Tolerated  Prosthesis/Orthosis Used:  (Immobilizer boot)  Precautions Comment: None  Pain  Pain Assessment  Pain Assessment: 0-10  0-10 (Numeric) Pain Score: 1  Pain Type: Acute pain  Pain Location: Leg  Pain Orientation: Right, Left  Pain Radiating Towards: Gastroc Medial  Pain Descriptors: Tightness, Sore  Pain Frequency: Intermittent  Patient's Stated Pain Goal: No pain    Objective   No longer with antalgic gait  AROM WNL compared to left side  +TTP medial gastroc       TREATMENT    MANUAL: x 1 (10 minutes)  IASTM gastroc/soleus complex right leg (medial); patient in prone; 10 minutes.    THERAPEUTIC EXERCISE: x 1 (28 minutes)  Prone eccentric PF with therapist holding green band; 2 x 10 reps  Supine double leg bridge; 20 reps  Step up on 2\" step with hand rail assist; 2 x 12 reps, right leg " only  Step up on airex foam pad; 2 x 10 reps, UE support on parallel bar  Mini bilateral heel raise; 2 x 10 reps  TG level 5; bilateral squat; 2 x 10 reps  Step to 3 step L,R,L; 5x through    Access Code: [email or web address]  URL: [email or web address]  Date: 05/08/2025  Prepared by: Nora Hernandez    Home Exercises:  - Seated Heel Slide  - 1 x daily - 7 x weekly - 2 sets - 10 reps  - Seated Ankle Pumps  - 1 x daily - 7 x weekly - 2 sets - 10 reps  - Ankle and Toe Plantarflexion with Resistance  - 1 x daily - 7 x weekly - 3 sets - 10 reps - 5 second holds hold  - Mini Squat with Counter Support  - 1 x daily - 7 x weekly - 2 sets - 10 reps  - Seated Ankle Eversion with Resistance  - 1 x daily - 7 x weekly - 2 sets - 10 reps  - Seated Isometric Ankle Plantarflexion  - 2 x daily - 7 x weekly - 3 sets - 10 reps - 2-3 seconds hold  - Heel Raises with Counter Support  - 1 x daily - 7 x weekly - 2 sets - 5 reps  - Single Leg Stance with Support  - 1 x daily - 7 x weekly - 2 sets - 10 reps - 5' hold hold    HEP: Patient verbalizes and demonstrates understanding of home exercises. Patient will contact writer if with questions or if condition worsens.   Additional Patient Education:   Reduce walking for next 2-3 weeks, reduce aggressive stretching 2-3 weeks, reviewed progressive tensile tissue loading - how we will properly improve strength.  Continue to wear boot.      Charges: 97110x2, 97802     Assessment:  Patient continues to demonstrate signs of medial gastroc partial tear early in healing phase, or acute recovery. Patient requires use of walking boot to limit plantar flexion and protect gastroc/soleus this stage of recovery. Continues to respond well to manual and there ex with reports of decreased pain with walking after session. Benefits from skilled care, required to return to PLOF.     Plan:  Walking boot - continued activity modification  Fu 1x/week   Assess response to HEP    Goals:  Patient will  demonstrate ability to perform bilateral heel raise in 8 weeks, pain-free.   Patient demonstrates improved ankle mobility pain-free symmetrical to unaffected leg in 8 weeks.   LEFS score of at least 70/80 in 12 weeks to return to PLOF.   Patient will be independent with HEP.        Sri Hernandez PT, DPT

## 2025-05-15 ENCOUNTER — TREATMENT (OUTPATIENT)
Dept: PHYSICAL THERAPY | Facility: CLINIC | Age: 60
End: 2025-05-15
Payer: COMMERCIAL

## 2025-05-15 DIAGNOSIS — R25.2 CALF CRAMP: ICD-10-CM

## 2025-05-15 DIAGNOSIS — S86.111A GASTROCNEMIUS STRAIN, RIGHT, INITIAL ENCOUNTER: ICD-10-CM

## 2025-05-15 PROCEDURE — 97110 THERAPEUTIC EXERCISES: CPT | Mod: GP

## 2025-05-15 PROCEDURE — 97140 MANUAL THERAPY 1/> REGIONS: CPT | Mod: GP

## 2025-05-15 ASSESSMENT — PAIN SCALES - GENERAL: PAINLEVEL_OUTOF10: 1

## 2025-05-15 ASSESSMENT — PAIN DESCRIPTION - DESCRIPTORS: DESCRIPTORS: TIGHTNESS

## 2025-05-15 ASSESSMENT — PAIN - FUNCTIONAL ASSESSMENT: PAIN_FUNCTIONAL_ASSESSMENT: 0-10

## 2025-05-15 NOTE — PROGRESS NOTES
Physical Therapy    Physical Therapy Treatment    Patient Name: Hay Miller  MRN: [id number]  Today's Date: 5/15/2025    Time Entry:   Time Calculation  Start Time: 1610  Stop Time: 1649  Time Calculation (min): 39 min     PT Therapeutic Procedures Time Entry  Manual Therapy Time Entry: 15  Therapeutic Exercise Time Entry: 24                   Reason for Visit:  Referred by: Cole C Budinsky, MD (3/20/2025)   Referral DX: Calf cramp [R25.2], Gastrocnemius strain, right, initial encounter [S86.111A]      Insurance:  Visit: #7  Authorized: to be determined  Payor/Plan:  MEDICAL [employer] [facility] [employer]   2025 BENEFIT /  NO AUTH / 10% COINS / MED NEC    Current Problem  1. Calf cramp  Follow Up In Physical Therapy      2. Gastrocnemius strain, right, initial encounter  Follow Up In Physical Therapy          Subjective    Patient has boot for home, wears it to work and when out of the house.   Reports improvement, is able to walk 5-10 minutes before feeling tightness.     Compliant with home exercises.     Precautions  Precautions  STEADI Fall Risk Score (The score of 4 or more indicates an increased risk of falling): 0  LE Weight Bearing Status: Weight Bearing as Tolerated  Prosthesis/Orthosis Used:  (Immobilizer boot)  Precautions Comment: None  Pain  Pain Assessment  Pain Assessment: 0-10  0-10 (Numeric) Pain Score: 1  Pain Type: Acute pain  Pain Location: Leg  Pain Orientation: Right  Pain Radiating Towards: Lateral Gastroc  Pain Descriptors: Tightness  Pain Frequency: Intermittent  Patient's Stated Pain Goal: No pain    Objective   No longer with antalgic gait  AROM WNL compared to left side    Myofascial trigger points revealed in the gastroc / soleus muscle(s) by dry needle technique with noted needle fibrillation, local twitch response, reproduction of symptoms including but not limited to aching, burning and electricity. Noted are atypical tissue morphology characteristics of abnormal  density, palpable margins, contracted/fibrotic muscle and fascial tissue with resistance to penetration. These characteristics reflect abnormal tissue function, innervation and nervous system communication.      TREATMENT    MANUAL: x 1 (15 minutes)  STM right gastroc/soleus complex; 10 minutes  DN; 5 minutes  Patient was educated on risks and benefits associated with dry needling, what to expect, and post-procedure protocol (i.e. increased water intake, increased stretching, etc.). Patient was also educated on modifications to HEP.  Verbal consent was received to proceed with treatment.     Trigger point needling was performed to the following muscles right gastroc and soleus. All trigger/tender points were marked and noted. Patient was prepped with 70% Isopropyl alcohol to the site(s). Sterile, single use, solid filament needles were inserted at various depths and angles to release tight tissue, improve microcirculation and remove neuro-noxious chemicals via a myofascial twitch response.      Patient in prone.     THERAPEUTIC EXERCISE: x 2  (24 minutes)  Seated heel to toe rocks; 20 reps  AP board PF/DF rocks; 2 x 10 reps  Standing assisted bilateral eccentric heel raised; x 10   Seated PF against green band; 20 reps    Access Code: [email or web address]  URL: [email or web address]  Date: 05/15/2025  Prepared by: Nora Hernandez    Home Exercises:  - Ankle and Toe Plantarflexion with Resistance  - 1 x daily - 7 x weekly - 3 sets - 10 reps - 5 second holds hold  - Seated Ankle Plantarflexion with Resistance  - 1 x daily - 7 x weekly - 2 sets - 10 reps  - Single Leg Stance with Support  - 1 x daily - 7 x weekly - 2 sets - 10 reps - 5' hold hold  - Heel Raises with Counter Support  - 1 x daily - 7 x weekly - 2 sets - 5 reps  - Seated Heel Slide  - 1 x daily - 7 x weekly - 2 sets - 10 reps  - Seated Ankle Pumps  - 1 x daily - 7 x weekly - 2 sets - 10 reps  - Mini Squat with Counter Support  - 1 x daily - 7 x  weekly - 2 sets - 10 reps  - Seated Ankle Eversion with Resistance  - 1 x daily - 7 x weekly - 2 sets - 10 reps  - Seated Isometric Ankle Plantarflexion  - 2 x daily - 7 x weekly - 3 sets - 10 reps - 2-3 seconds hold    HEP: Patient verbalizes and demonstrates understanding of home exercises. Patient will contact writer if with questions or if condition worsens.   Additional Patient Education:   Reduce walking for next 2-3 weeks, reduce aggressive stretching 2-3 weeks, reviewed progressive tensile tissue loading - how we will properly improve strength.  Continue to wear boot.      Charges: [id number], [id number]     Assessment:  Patient continues to endorse gastroc pain with longer walks but is improving each week. Continues to wear boot immobilizer to promote healing. Responds well to session today. Continues to benefit from skilled care.     Plan:  Walking boot - continued activity modification  Fu 1x/week   Assess response to HEP    Goals:  Patient will demonstrate ability to perform bilateral heel raise in 8 weeks, pain-free.   Patient demonstrates improved ankle mobility pain-free symmetrical to unaffected leg in 8 weeks.   LEFS score of at least 70/80 in 12 weeks to return to PLOF.   Patient will be independent with HEP.        Sri Hernandez PT, DPT

## 2025-05-20 ENCOUNTER — OFFICE VISIT (OUTPATIENT)
Dept: ORTHOPEDIC SURGERY | Facility: CLINIC | Age: 60
End: 2025-05-20
Payer: COMMERCIAL

## 2025-05-20 DIAGNOSIS — S86.111A GASTROCNEMIUS TEAR, RIGHT, INITIAL ENCOUNTER: ICD-10-CM

## 2025-05-20 PROCEDURE — 99214 OFFICE O/P EST MOD 30 MIN: CPT | Performed by: FAMILY MEDICINE

## 2025-05-20 PROCEDURE — 1036F TOBACCO NON-USER: CPT | Performed by: FAMILY MEDICINE

## 2025-05-20 PROCEDURE — 99212 OFFICE O/P EST SF 10 MIN: CPT | Performed by: FAMILY MEDICINE

## 2025-05-20 NOTE — PROGRESS NOTES
Follow-Up Patient Visit: Lower Extremity Injury  Assessment & Plan  Gastrocnemius strain, right  Chronic strain with persistent lateral discomfort at the gastroc muscle belly and musculotendinous junction for five months. Differential includes stress fracture, lipoma, cyst, hematoma, or myositis ossificans. MRI recommended for further evaluation.  - Order MRI of the right leg to evaluate for stress fracture, lipoma, cyst, or other masses.  - Continue physical therapy with two remaining sessions, reassess post-MRI.  - Provide work note for boot usage as needed.  - Advise boot use for comfort as needed.    Orders Placed This Encounter    MR tibia fibula right wo IV contrast     1. Gastrocnemius tear, right, initial encounter      Procedures  At the conclusion of the visit there were no further questions by the patient/family regarding their plan of care.  Patient was instructed to call or return with any issues, questions, or concerns regarding their injury and/or treatment plan described above.    PHYSICAL EXAM:  Neuro: Gross sensation intact to the lower extremities bilaterally.  Lower extremity: Lower extremity exam as below  Physical Exam  MUSCULOSKELETAL: Lateral right gastrocnemius pain and tenderness. Full plantar flexion, dorsiflexion, eversion, and inversion. Proximal medial calf nontender. Pain laterally and at midsubstance of musculotendinous junction. Achilles tendon intact. Active movement non-tender. Negative heel tap and calcaneal squeeze.    IMAGING:   Results  RADIOLOGY  Ultrasound: Performed by physical therapist, details not specified  XR foot left 3+ views  Narrative: Interpreted By:  Emma Carpenter,   STUDY:  Left foot, 3 views      INDICATION:  Left foot pain      COMPARISON:  None.      ACCESSION NUMBER(S):  [id number]      ORDERING CLINICIAN:  KIERA BIRMINGHAM      FINDINGS:  Mild hallux valgus.  No significant degenerative changes.  Soft tissues are within normal limits. Small Achilles  insertional  enthesophyte noted on the calcaneus. No erosions.      Impression: 1. Mild hallux valgus.  2.  Small Achilles insertional enthesophyte which can be associated  with chronic tendinosis.      MACRO:  None.      Signed by: Emma Carpenter 1/5/2025 11:32 AM  Dictation workstation:   [id number]       HPI  Patient ID: Hay Miller is a 60 y.o. male who presents for Follow-up of the Right Lower Leg (S/p: calf strain 4.5 weeks out ).  History of Present Illness  Hay Miller is a 60 year old male who presents with persistent lateral right leg pain.    He has been experiencing persistent discomfort in his right leg, primarily on the lateral side, since December. The pain occurs during ambulation and is not associated with numbness or weakness. He initially sought medical attention in January due to concerns of an Achilles tendon tear.    He has been using a boot, which he finds helpful, especially at work and when going out, although he does not wear it at home. He tried using lifts in his shoes but found them more painful, particularly at the back of the foot, and has since discontinued their use.    He has been undergoing physical therapy, including dry needling, which revealed a particularly tight lower muscle. He has one more physical therapy session scheduled this week and another next week.    A prior ultrasound was performed by his physical therapist, but no MRI has been conducted yet. He recalls no specific trauma to the area that could have caused the initial injury.    No numbness, tingling, or weakness in the affected leg.            This medical note was created with the assistance of artificial intelligence (AI) for documentation purposes. The content has been reviewed and confirmed by the healthcare provider for accuracy and completeness. Patient consented to the use of audio recording and use of AI during their visit.     05/20/25 at 6:24 PM - Cole C Budinsky, MD  Office:   [phone]

## 2025-05-20 NOTE — LETTER
May 20, 2025     Patient: Hay Miller   YOB: 1965   Date of Visit: 5/20/2025       To Whom It May Concern:    It is my medical opinion that Hay Miller may return to work and only use his boot as needed pending his MRI results.    If you have any questions or concerns, please don't hesitate to call.         Sincerely,        Cole C Budinsky, MD

## 2025-05-22 ENCOUNTER — TREATMENT (OUTPATIENT)
Dept: PHYSICAL THERAPY | Facility: CLINIC | Age: 60
End: 2025-05-22
Payer: COMMERCIAL

## 2025-05-22 DIAGNOSIS — R25.2 CALF CRAMP: ICD-10-CM

## 2025-05-22 DIAGNOSIS — S86.111A GASTROCNEMIUS STRAIN, RIGHT, INITIAL ENCOUNTER: ICD-10-CM

## 2025-05-22 PROCEDURE — 97110 THERAPEUTIC EXERCISES: CPT | Mod: GP

## 2025-05-22 PROCEDURE — 97140 MANUAL THERAPY 1/> REGIONS: CPT | Mod: GP

## 2025-05-22 ASSESSMENT — PAIN - FUNCTIONAL ASSESSMENT: PAIN_FUNCTIONAL_ASSESSMENT: 0-10

## 2025-05-22 ASSESSMENT — PAIN DESCRIPTION - DESCRIPTORS: DESCRIPTORS: TIGHTNESS

## 2025-05-22 ASSESSMENT — PAIN SCALES - GENERAL: PAINLEVEL_OUTOF10: 1

## 2025-05-22 NOTE — PROGRESS NOTES
Physical Therapy    Physical Therapy Treatment    Patient Name: Hay Miller  MRN: [id number]  Today's Date: 5/22/2025    Time Entry:   Time Calculation  Start Time: 1058  Stop Time: 1138  Time Calculation (min): 40 min     PT Therapeutic Procedures Time Entry  Manual Therapy Time Entry: 15  Therapeutic Exercise Time Entry: 25                   Reason for Visit:  Referred by: Cole C Budinsky, MD (3/20/2025)   Referral DX: Calf cramp [R25.2], Gastrocnemius strain, right, initial encounter [S86.111A]      Insurance:  Visit: #8  Authorized: to be determined  Payor/Plan:  MEDICAL [employer] [facility] [employer] [facility] MED   2025 BENEFIT /  NO AUTH / 10% COINS / MED NEC    Current Problem  1. Calf cramp  Follow Up In Physical Therapy      2. Gastrocnemius strain, right, initial encounter  Follow Up In Physical Therapy          Subjective    Reports he visited with doctor and they agreed for him to get and MRI, just to make sure nothing else is going on.   Patient reports he was doing really well until tightness returned during walking last night.   Had a great response to dry needling, felt better afterwards.    Precautions  Precautions  STEADI Fall Risk Score (The score of 4 or more indicates an increased risk of falling): 0  LE Weight Bearing Status: Weight Bearing as Tolerated  Prosthesis/Orthosis Used:  (Immobilizer boot)  Precautions Comment: None  Pain  Pain Assessment  Pain Assessment: 0-10  0-10 (Numeric) Pain Score: 1  Pain Type: Acute pain  Pain Location: Leg  Pain Orientation: Right  Pain Radiating Towards: Lateral Gastroc  Pain Descriptors: Tightness  Pain Frequency: Intermittent  Patient's Stated Pain Goal: No pain    Objective   No longer with antalgic gait  AROM WNL compared to left side    Myofascial trigger points revealed in the right gastroc / soleus muscle(s) by dry needle technique with noted needle fibrillation, local twitch response, reproduction of symptoms including but not limited to aching,  burning and electricity. Noted are atypical tissue morphology characteristics of abnormal density, palpable margins, contracted/fibrotic muscle and fascial tissue with resistance to penetration. These characteristics reflect abnormal tissue function, innervation and nervous system communication.      TREATMENT    MANUAL: x 1 (15 minutes)  STM right gastroc/soleus complex; 10 minutes  DN; 5 minutes  Patient was educated on risks and benefits associated with dry needling, what to expect, and post-procedure protocol (i.e. increased water intake, increased stretching, etc.). Patient was also educated on modifications to HEP.  Verbal consent was received to proceed with treatment.     Trigger point needling was performed to the following muscles right gastroc and soleus. All trigger/tender points were marked and noted. Patient was prepped with 70% Isopropyl alcohol to the site(s). Sterile, single use, solid filament needles were inserted at various depths and angles to release tight tissue, improve microcirculation and remove neuro-noxious chemicals via a myofascial twitch response.      Patient in prone.     THERAPEUTIC EXERCISE: x 2  (25 minutes)  Supine ankle pumps; 20 reps  Supine ankle pumps with yoga belt over pressure into dorsiflexion; 20 reps  Supine heel slides; 2 x 10 reps  Seated PF/Inversion with red band; knees bent; 3 x 10 reps  Total gym; level 3; soleus calf raise; 10 reps  Total gym; level 3; calf raise, straight leg; 10 reps    Access Code: [email or web address]  URL: [email or web address]  Date: 05/15/2025  Prepared by: Nora Hernandez    Home Exercises:  - Ankle and Toe Plantarflexion with Resistance  - 1 x daily - 7 x weekly - 3 sets - 10 reps - 5 second holds hold  - Seated Ankle Plantarflexion with Resistance  - 1 x daily - 7 x weekly - 2 sets - 10 reps  - Single Leg Stance with Support  - 1 x daily - 7 x weekly - 2 sets - 10 reps - 5' hold hold  - Heel Raises with Counter Support  - 1 x  daily - 7 x weekly - 2 sets - 5 reps  - Seated Heel Slide  - 1 x daily - 7 x weekly - 2 sets - 10 reps  - Seated Ankle Pumps  - 1 x daily - 7 x weekly - 2 sets - 10 reps  - Mini Squat with Counter Support  - 1 x daily - 7 x weekly - 2 sets - 10 reps  - Seated Ankle Eversion with Resistance  - 1 x daily - 7 x weekly - 2 sets - 10 reps  - Seated Isometric Ankle Plantarflexion  - 2 x daily - 7 x weekly - 3 sets - 10 reps - 2-3 seconds hold    HEP: Patient verbalizes and demonstrates understanding of home exercises. Patient will contact writer if with questions or if condition worsens.   Additional Patient Education:   Reduce walking for next 2-3 weeks, reduce aggressive stretching 2-3 weeks, reviewed progressive tensile tissue loading - how we will properly improve strength.  Continue to wear boot.      Charges: 97110x2, [id number]     Assessment:  Patient responds well again to dry needling and soft tissue. Continues to progress marginally, will be undergoing MRI for further evaluation of lower leg. Did well with soleus and calf raises today, feels impact of soleus raises more than straight leg calf raises. Continues to benefit from use of boot moving forward and gentle home program focused on progressive tissue loading. Benefits from skilled care.     Plan:  Walking boot - continued activity modification  Fu 2x/week   Assess response to HEP    Goals:  Patient will demonstrate ability to perform bilateral heel raise in 8 weeks, pain-free.   Patient demonstrates improved ankle mobility pain-free symmetrical to unaffected leg in 8 weeks.   LEFS score of at least 70/80 in 12 weeks to return to PLOF.   Patient will be independent with HEP.        Sri Hernandez PT, DPT

## 2025-05-30 ENCOUNTER — APPOINTMENT (OUTPATIENT)
Dept: RADIOLOGY | Facility: HOSPITAL | Age: 60
End: 2025-05-30
Payer: COMMERCIAL

## 2025-05-30 DIAGNOSIS — S86.111A GASTROCNEMIUS TEAR, RIGHT, INITIAL ENCOUNTER: ICD-10-CM

## 2025-05-30 PROCEDURE — 73718 MRI LOWER EXTREMITY W/O DYE: CPT | Mod: RT

## 2025-06-09 ENCOUNTER — TREATMENT (OUTPATIENT)
Dept: PHYSICAL THERAPY | Facility: CLINIC | Age: 60
End: 2025-06-09
Payer: COMMERCIAL

## 2025-06-09 DIAGNOSIS — S86.111D GASTROCNEMIUS STRAIN, RIGHT, SUBSEQUENT ENCOUNTER: Primary | ICD-10-CM

## 2025-06-09 PROCEDURE — 97110 THERAPEUTIC EXERCISES: CPT | Mod: GP

## 2025-06-09 ASSESSMENT — PAIN DESCRIPTION - DESCRIPTORS: DESCRIPTORS: TIGHTNESS

## 2025-06-09 ASSESSMENT — PAIN - FUNCTIONAL ASSESSMENT: PAIN_FUNCTIONAL_ASSESSMENT: 0-10

## 2025-06-09 ASSESSMENT — PAIN SCALES - GENERAL: PAINLEVEL_OUTOF10: 1

## 2025-06-09 NOTE — PROGRESS NOTES
"Physical Therapy    Physical Therapy Treatment    Patient Name: Hay Miller  MRN: [id number]  Today's Date: 6/9/2025    Time Entry:   Time Calculation  Start Time: 1032  Stop Time: 1101  Time Calculation (min): 29 min     PT Therapeutic Procedures Time Entry  Therapeutic Exercise Time Entry: 29                   Reason for Visit:  Referred by: Cole C Budinsky, MD (3/20/2025)   Referral DX: Calf cramp [R25.2], Gastrocnemius strain, right, initial encounter [S86.111A]      Insurance:  Visit: #9  Authorized: to be determined  Payor/Plan:  MEDICAL [employer] [facility]   2025 BENEFIT /  NO AUTH / 10% COINS / MED NEC    Current Problem  1. Gastrocnemius strain, right, subsequent encounter              Subjective    Through the [employer] path, 2 miles. Later in the day, it felt a little tight but is over all fine.   Has boot with him in case he needs it.   Reports \"has been feeling really good\". Vacation in early July he is preparing for, trying to build up some strength of lower extremities, already lost 10# walking with wife.     Felt good after dry needling last time.   Got an MRI- showed a mild fraying of soleus muscle on right side. No tendon dysfunction or tear noted. Will be following up with orthopedic in a few weeks.     Precautions  Precautions  STEADI Fall Risk Score (The score of 4 or more indicates an increased risk of falling): 0  LE Weight Bearing Status: Weight Bearing as Tolerated  Prosthesis/Orthosis Used:  (Immobilizer boot)  Precautions Comment: None  Pain  Pain Assessment  Pain Assessment: 0-10  0-10 (Numeric) Pain Score: 1  Pain Type: Acute pain  Pain Location: Leg  Pain Orientation: Right  Pain Radiating Towards: Gastroc  Pain Descriptors: Tightness  Pain Frequency: Once a week    Objective   No longer with antalgic gait  AROM WNL compared to left side    MRI Right Calf: 05/30/2025  IMPRESSION:  Findings of mild gastrocnemius and soleus muscular injury of the  right calf " "without evidence of associated tendon tear or focal fluid  collection.    TREATMENT:  THERAPEUTIC EXERCISE: x 2 (29 minutes)  Reviewed MRI results- discussion with doctor for follow up.   Recommended continued activity modification- using boot as needed. Patient understands.     Supine glute  bridge with alternating leg march; 20 reps   Standing step ups 4\" step alternating legs; 20 reps  Standing gastroc straight leg bilateral heel raise at parallel bar; 20 reps  Standing soleus slight bend knee bilateral heel raise at parallel bar; 3 x 4 reps  Isometric mini squat and hold with side steps R/L; 10 reps (2 steps R and L) x 3 sets  Side step onto airex foam pad without support; hold 2-3 seconds; each leg 15 reps     Access Code: [email or web address]  URL: [email or web address]  Date: 05/15/2025  Prepared by: Nora Hernandez    Home Exercises:  - Ankle and Toe Plantarflexion with Resistance  - 1 x daily - 7 x weekly - 3 sets - 10 reps - 5 second holds hold  - Seated Ankle Plantarflexion with Resistance  - 1 x daily - 7 x weekly - 2 sets - 10 reps  - Single Leg Stance with Support  - 1 x daily - 7 x weekly - 2 sets - 10 reps - 5' hold hold  - Heel Raise and Soleus Raise with Counter Support  - 1 x daily - 7 x weekly - 2 sets - 5 reps each exercise  - Seated Heel Slide  - 1 x daily - 7 x weekly - 2 sets - 10 reps  - Seated Ankle Pumps  - 1 x daily - 7 x weekly - 2 sets - 10 reps  - Mini Squat with Counter Support  - 1 x daily - 7 x weekly - 2 sets - 10 reps  - Seated Ankle Eversion with Resistance  - 1 x daily - 7 x weekly - 2 sets - 10 reps  - Seated Isometric Ankle Plantarflexion  - 2 x daily - 7 x weekly - 3 sets - 10 reps - 2-3 seconds hold    HEP: Patient verbalizes and demonstrates understanding of home exercises. Patient will contact writer if with questions or if condition worsens.   Additional Patient Education:   Continue to wear boot PRN.      Charges: 97110x2     Assessment:  Patient responds well to " progressive closed chain BLE strengthening. Reports some fatigue and difficulty with soleus raises but no increase in pain. Updated home program to reflect progress. Patient's MRI is consistent with suspected finding of soleus/gastroc minor tear. Continues to benefit from skilled care and activity modification with use of boot as needed.     Plan:  Walking boot PRN- continued activity modification as needed, slow increase in walking distance  FU bi weekly   Assess response to HEP    Goals:  Patient will demonstrate ability to perform bilateral heel raise in 8 weeks, pain-free.   Patient demonstrates improved ankle mobility pain-free symmetrical to unaffected leg in 8 weeks.   LEFS score of at least 70/80 in 12 weeks to return to PLOF.   Patient will be independent with HEP.        Sri Hernandez PT, DPT

## 2025-06-24 ENCOUNTER — TREATMENT (OUTPATIENT)
Dept: PHYSICAL THERAPY | Facility: CLINIC | Age: 60
End: 2025-06-24
Payer: COMMERCIAL

## 2025-06-24 DIAGNOSIS — S86.111D GASTROCNEMIUS STRAIN, RIGHT, SUBSEQUENT ENCOUNTER: Primary | ICD-10-CM

## 2025-06-24 PROCEDURE — 97110 THERAPEUTIC EXERCISES: CPT | Mod: GP

## 2025-06-24 PROCEDURE — 97140 MANUAL THERAPY 1/> REGIONS: CPT | Mod: GP

## 2025-06-24 ASSESSMENT — PAIN - FUNCTIONAL ASSESSMENT: PAIN_FUNCTIONAL_ASSESSMENT: 0-10

## 2025-06-24 ASSESSMENT — PAIN DESCRIPTION - DESCRIPTORS: DESCRIPTORS: TIGHTNESS;ACHING

## 2025-06-24 ASSESSMENT — PAIN SCALES - GENERAL: PAINLEVEL_OUTOF10: 1

## 2025-06-24 NOTE — PROGRESS NOTES
Physical Therapy    Physical Therapy Treatment    Patient Name: Hay Miller  MRN: [id number]  Today's Date: 6/24/2025    Time Entry:   Time Calculation  Start Time: 1620  Stop Time: 1658  Time Calculation (min): 38 min     PT Therapeutic Procedures Time Entry  Therapeutic Exercise Time Entry: 28  Manual Therapy Time Entry: 10                   Reason for Visit:  Referred by: Cole C Budinsky, MD (3/20/2025)   Referral DX: Calf cramp [R25.2], Gastrocnemius strain, right, initial encounter [S86.111A]      Insurance:  Visit: #10  Authorized: to be determined  Payor/Plan:  MEDICAL [employer] [facility] [facility]   2025 BENEFIT /  NO AUTH / 10% COINS / MED NEC    Current Problem  1. Gastrocnemius strain, right, subsequent encounter [S86.111D]              Subjective    I wouldn't say I am 100% been walking pretty much normally since last visit. 95% better maybe.   Up to 2 miles, walking every day.     A little tightness today, may have over done it with lifting.     Precautions  Precautions  STEADI Fall Risk Score (The score of 4 or more indicates an increased risk of falling): 0  LE Weight Bearing Status: Weight Bearing as Tolerated  Prosthesis/Orthosis Used:  (Immobilizer boot)  Precautions Comment: None.  Pain  Pain Assessment  Pain Assessment: 0-10  0-10 (Numeric) Pain Score: 1  Pain Location: Leg  Pain Orientation: Right  Pain Radiating Towards: Gastroc  Pain Descriptors: Tightness, Aching  Pain Frequency: Once a week    Objective   No longer with antalgic gait  AROM WNL compared to left side    MRI Right Calf: 05/30/2025  IMPRESSION:  Findings of mild gastrocnemius and soleus muscular injury of the  right calf without evidence of associated tendon tear or focal fluid  collection.    TREATMENT:    MANUAL: x 1 (10 minutes)   IASTM right calf; 10 minutes, patient in prone.     THERAPEUTIC EXERCISE: x 2 (28 minutes)  Prone PF/DF knee bent; 2 x 20 reps  BFR 80% 30, 15, 15, 15  - Seated heel raise  Standing  heel raise; BUE supported; 2 x 8 reps  Alternating soleus and calf stretch; 10' x 10 reps     Access Code: [email or web address]  URL: [email or web address]  Date: 06/24/2025  Prepared by: Nora Hernandez    Home Exercises:   - Ankle and Toe Plantarflexion with Resistance  - 1 x daily - 7 x weekly - 3 sets - 10 reps - 5 second holds hold  - Seated Ankle Plantarflexion with Resistance  - 1 x daily - 7 x weekly - 2 sets - 10 reps  - Single Leg Stance with Support  - 1 x daily - 7 x weekly - 2 sets - 10 reps - 5' hold hold  - Heel Raises with Counter Support  - 1 x daily - 7 x weekly - 4 sets - 5 reps  - Seated Heel Slide  - 1 x daily - 7 x weekly - 2 sets - 10 reps  - Seated Ankle Pumps  - 1 x daily - 7 x weekly - 2 sets - 10 reps  - Mini Squat with Counter Support  - 1 x daily - 7 x weekly - 2 sets - 10 reps  - Seated Ankle Eversion with Resistance  - 1 x daily - 7 x weekly - 2 sets - 10 reps  - Seated Isometric Ankle Plantarflexion  - 2 x daily - 7 x weekly - 3 sets - 10 reps - 2-3 seconds hold  - Wall Squat  - 1 x daily - 7 x weekly - 2 sets - 10 reps    Patient verbalizes and demonstrates understanding of home exercises. Patient will contact writer if with questions or if condition worsens.   Additional Patient Education:   Continue to wear boot PRN.      Charges: 97110x2, [id number]      Assessment:  Patient is able to tolerate BFR with appropriate reported fatigue. Continues to progress as tolerated.   Appropriate for skilled care.  Discussed how to avoid re injure on vacation.       Plan:  Walking boot PRN- continued activity modification as needed, slow increase in walking distance  FU bi weekly   Assess response to HEP    Goals:  Patient will demonstrate ability to perform bilateral heel raise in 8 weeks, pain-free.   Patient demonstrates improved ankle mobility pain-free symmetrical to unaffected leg in 8 weeks.   LEFS score of at least 70/80 in 12 weeks to return to PLOF.   Patient will be independent with  HEP.        Sri Hernandez PT, DPT

## 2025-06-26 ENCOUNTER — APPOINTMENT (OUTPATIENT)
Dept: ORTHOPEDIC SURGERY | Facility: CLINIC | Age: 60
End: 2025-06-26
Payer: COMMERCIAL

## 2025-06-26 DIAGNOSIS — S86.111A GASTROCNEMIUS STRAIN, RIGHT, INITIAL ENCOUNTER: Primary | ICD-10-CM

## 2025-06-26 DIAGNOSIS — R25.2 CALF CRAMP: ICD-10-CM

## 2025-06-26 PROCEDURE — 99212 OFFICE O/P EST SF 10 MIN: CPT | Performed by: FAMILY MEDICINE

## 2025-06-26 NOTE — PROGRESS NOTES
Follow-Up Patient Visit: Lower Extremity Injury  Assessment & Plan  Gastrocnemius strain, right  Improvement noted with no tendon tear or significant fluid collection. Continued improvement expected with exercise.  - Continue activities as tolerated.  - Use compression socks or sleeves as needed.  - Consider injection if persistent pain occurs.    Leg swelling  Chronic swelling likely due to medication or venous insufficiency. No signs of cellulitis or cardiac issues. Swelling decreased recently.  - Wear compression socks.  - Elevate legs.  - Perform foot and ankle exercises.  - Consult primary care if swelling worsens.  No orders of the defined types were placed in this encounter.    No diagnosis found.  Procedures  At the conclusion of the visit there were no further questions by the patient/family regarding their plan of care.  Patient was instructed to call or return with any issues, questions, or concerns regarding their injury and/or treatment plan described above.    PHYSICAL EXAM:  Neuro: Gross sensation intact to the lower extremities bilaterally.  Lower extremity: Right lower extremity exam:  Physical Exam  EXTREMITIES: Right lower extremity skin warm, pink, well perfused. Right gastroc non-tender to medial and lateral heads. Negative tib-fib squeeze, pulses and sensation intact throughout. No pitting edema.  MUSCULOSKELETAL: Full plantar flexion, dorsiflexion, eversion, inversion, weight bearing without pain.    IMAGING:   Results  RADIOLOGY  Right lower extremity MRI: Partial tears in the gastrocnemius muscles, no hematoma, no significant fluid collection  MR tibia fibula right wo IV contrast  Narrative: Interpreted By:  Rubin Hoang,   STUDY:  MR TIBIA FIBULA RIGHT WO IV CONTRAST;      INDICATION:  Signs/Symptoms:pain.      COMPARISON:  None      ACCESSION NUMBER(S):  [id number]      ORDERING CLINICIAN:  COLE BUDINSKY      TECHNIQUE:  Multiplanar multisequential MRI of the right lower leg  without  contrast.      FINDINGS:  There are findings consistent with a mild degree of soleus  musculature injury of the right lower leg. Some feathery muscular  signal seen involuntarily soleus myotendinous junction along a  segment roughly 7 cm in length by 1-2 cm in circumference. There is  no evidence of a discrete tendon tear.      There is no focal fluid collection identified.      An additional smaller area of some further edema noticed near the  myotendinous junction of the medial gastrocnemius also consistent  with no health muscular injury.      Nonspecific mild subcutaneous edema of the right lower leg.      No other muscular signal abnormality.      No other soft tissue mass.      No evidence of fracture.      No marrow edema or marrow replacement process.          Impression: Findings of mild gastrocnemius and soleus muscular injury of the  right calf without evidence of associated tendon tear or focal fluid  collection.      Signed by: Rubin Hoang 5/31/2025 6:03 AM  Dictation workstation:   [id number]       HPI  Patient ID: Hay Miller is a 60 y.o. male who presents for Follow-up of the Right Lower Leg (Gastrocnemius strain/MRI).  History of Present Illness  Hay Miller is a 60 year old male with a history of cellulitis who presents with right leg muscle strain and swelling.    He reports improvement in his right leg muscle strain, although it has not fully recovered. He can walk normally and has not used his boot for approximately three weeks. Exercise has been beneficial in his recovery.    He describes persistent swelling in his right leg, which has decreased recently but was previously significant enough to cause pitting edema. No pain in the calf is noted. He has a history of cellulitis but does not believe the current swelling is related to it.    He does not require a work note or excuse as he is managing well.            This medical note was created with the assistance of  artificial intelligence (AI) for documentation purposes. The content has been reviewed and confirmed by the healthcare provider for accuracy and completeness. Patient consented to the use of audio recording and use of AI during their visit.     06/27/25 at 5:41 PM - Cole C Budinsky, MD  Office:  [phone]

## 2025-06-30 ENCOUNTER — APPOINTMENT (OUTPATIENT)
Dept: ORTHOPEDIC SURGERY | Facility: CLINIC | Age: 60
End: 2025-06-30
Payer: COMMERCIAL

## 2025-07-17 ENCOUNTER — TREATMENT (OUTPATIENT)
Dept: PHYSICAL THERAPY | Facility: CLINIC | Age: 60
End: 2025-07-17
Payer: COMMERCIAL

## 2025-07-17 DIAGNOSIS — S86.111D GASTROCNEMIUS STRAIN, RIGHT, SUBSEQUENT ENCOUNTER: Primary | ICD-10-CM

## 2025-07-17 PROCEDURE — 97110 THERAPEUTIC EXERCISES: CPT | Mod: GP

## 2025-07-17 ASSESSMENT — PAIN - FUNCTIONAL ASSESSMENT: PAIN_FUNCTIONAL_ASSESSMENT: 0-10

## 2025-07-17 ASSESSMENT — PAIN SCALES - GENERAL: PAINLEVEL_OUTOF10: 0 - NO PAIN

## 2025-07-17 NOTE — PROGRESS NOTES
Physical Therapy    Physical Therapy Treatment    Patient Name: Hay Miller  MRN: [id number]  Today's Date: 7/17/2025    Time Entry:   Time Calculation  Start Time: 1600  Stop Time: 1628  Time Calculation (min): 28 min     PT Therapeutic Procedures Time Entry  Therapeutic Exercise Time Entry: 28                   Reason for Visit:  Referred by: Cole C Budinsky, MD (3/20/2025)   Referral DX: Calf cramp [R25.2], Gastrocnemius strain, right, initial encounter [S86.111A]      Insurance:  Visit: #11  Authorized: to be determined  Payor/Plan:  MEDICAL [facility]   2025 BENEFIT /  NO AUTH / 10% COINS / MED NEC    Current Problem  1. Gastrocnemius strain, right, subsequent encounter [S86.111D]            Subjective    Visit with doctor went well last time, doctor thinks it will heal itself.   I went on vacation to [place] and it was fine, I mean sore some nights and tight but I didn't have to put my boot on or anything like that. Walking 20k steps a day.   Trying to keep up with exercise.   Ready to discharge today- feeling good.    Precautions:  Precautions  STEADI Fall Risk Score (The score of 4 or more indicates an increased risk of falling): 0  LE Weight Bearing Status: Weight Bearing as Tolerated  Prosthesis/Orthosis Used:  (Immobilizer boot)  Precautions Comment: None.  Pain:  Pain Assessment  Pain Assessment: 0-10  0-10 (Numeric) Pain Score: 0 - No pain    Objective   No longer antalgic gait.  No pain with heel raises/dorsiflexion.    TREATMENT:      THERAPEUTIC EXERCISE: x 2 (28 minutes)  Ran through home program below x 1    Access Code: [email or web address]  URL: [email or web address]  Date: 07/17/2025  Prepared by: Nora Hernandez    Exercises  - Ankle and Toe Plantarflexion with Resistance  - 1 x daily - 7 x weekly - 3 sets - 10 reps  - Seated Ankle Plantarflexion with Resistance  - 1 x daily - 7 x weekly - 3 sets - 10 reps  - Seated Isometric Ankle Plantarflexion  - 1 x  daily - 7 x weekly - 1 sets - 10 reps - 2-3 seconds hold  - Seated Ankle Eversion with Resistance  - 1 x daily - 7 x weekly - 2 sets - 10 reps  - Seated Heel Slide  - 1 x daily - 7 x weekly - 2 sets - 10 reps  - Single Leg Stance with Support  - 1 x daily - 7 x weekly - 1 sets - 10 reps - 5' hold  - Heel Raises with Counter Support  - 1 x daily - 7 x weekly - 3 sets - 10 reps  - Mini Squat with Counter Support  - 1 x daily - 7 x weekly - 2 sets - 10 reps    Patient verbalizes and demonstrates understanding of home exercises. Patient will contact writer if with questions or if condition worsens.   Additional Patient Education:   Keeping up with maintenance home program.   Wearing boot if re injury occurs.     Charges: [id number]     Assessment:  Patient benefits from running through home program one time for discharge. Is appropriate to continue progress independently at home.     Discharge Reason:  Met goals.    Plan:  Discharge  FU as needed/ if needed    Goals: (all goals met)  Patient will demonstrate ability to perform bilateral heel raise in 8 weeks, pain-free.  Patient demonstrates improved ankle mobility pain-free symmetrical to unaffected leg in 8 weeks.   LEFS score of at least 70/80 in 12 weeks to return to PLOF.   Patient will be independent with HEP.        Sri Hernandez PT, DPT

## 2025-07-31 ENCOUNTER — APPOINTMENT (OUTPATIENT)
Dept: PHYSICAL THERAPY | Facility: CLINIC | Age: 60
End: 2025-07-31
Payer: COMMERCIAL